# Patient Record
Sex: MALE | Race: OTHER | Employment: UNEMPLOYED | URBAN - METROPOLITAN AREA
[De-identification: names, ages, dates, MRNs, and addresses within clinical notes are randomized per-mention and may not be internally consistent; named-entity substitution may affect disease eponyms.]

---

## 2018-12-16 ENCOUNTER — HOSPITAL ENCOUNTER (INPATIENT)
Age: 43
LOS: 5 days | Discharge: HOME | DRG: 603 | End: 2018-12-21
Attending: EMERGENCY MEDICINE | Admitting: INTERNAL MEDICINE
Payer: SELF-PAY

## 2018-12-16 DIAGNOSIS — L03.114 CELLULITIS OF LEFT UPPER EXTREMITY: Primary | ICD-10-CM

## 2018-12-16 PROBLEM — F17.200 SMOKING: Status: ACTIVE | Noted: 2018-12-16

## 2018-12-16 PROBLEM — L03.90 CELLULITIS: Status: ACTIVE | Noted: 2018-12-16

## 2018-12-16 LAB
ALBUMIN SERPL-MCNC: 3.4 G/DL (ref 3.5–5)
ALBUMIN/GLOB SERPL: 0.9 {RATIO} (ref 1.2–3.5)
ALP SERPL-CCNC: 139 U/L (ref 50–136)
ALT SERPL-CCNC: 27 U/L (ref 12–65)
ANION GAP SERPL CALC-SCNC: 7 MMOL/L (ref 7–16)
AST SERPL-CCNC: 13 U/L (ref 15–37)
BASOPHILS # BLD: 0 K/UL (ref 0–0.2)
BASOPHILS NFR BLD: 0 % (ref 0–2)
BILIRUB SERPL-MCNC: 0.4 MG/DL (ref 0.2–1.1)
BUN SERPL-MCNC: 9 MG/DL (ref 6–23)
CALCIUM SERPL-MCNC: 8.3 MG/DL (ref 8.3–10.4)
CHLORIDE SERPL-SCNC: 106 MMOL/L (ref 98–107)
CO2 SERPL-SCNC: 28 MMOL/L (ref 21–32)
CREAT SERPL-MCNC: 0.76 MG/DL (ref 0.8–1.5)
DIFFERENTIAL METHOD BLD: ABNORMAL
EOSINOPHIL # BLD: 0.5 K/UL (ref 0–0.8)
EOSINOPHIL NFR BLD: 4 % (ref 0.5–7.8)
ERYTHROCYTE [DISTWIDTH] IN BLOOD BY AUTOMATED COUNT: 14 % (ref 11.9–14.6)
GLOBULIN SER CALC-MCNC: 3.9 G/DL (ref 2.3–3.5)
GLUCOSE SERPL-MCNC: 116 MG/DL (ref 65–100)
HCT VFR BLD AUTO: 41.5 % (ref 41.1–50.3)
HGB BLD-MCNC: 13.8 G/DL (ref 13.6–17.2)
IMM GRANULOCYTES # BLD: 0.1 K/UL (ref 0–0.5)
IMM GRANULOCYTES NFR BLD AUTO: 1 % (ref 0–5)
LACTATE BLD-SCNC: 1.01 MMOL/L (ref 0.5–1.9)
LACTATE BLD-SCNC: 1.88 MMOL/L (ref 0.5–1.9)
LYMPHOCYTES # BLD: 1.4 K/UL (ref 0.5–4.6)
LYMPHOCYTES NFR BLD: 10 % (ref 13–44)
MCH RBC QN AUTO: 29.1 PG (ref 26.1–32.9)
MCHC RBC AUTO-ENTMCNC: 33.3 G/DL (ref 31.4–35)
MCV RBC AUTO: 87.6 FL (ref 79.6–97.8)
MONOCYTES # BLD: 1.3 K/UL (ref 0.1–1.3)
MONOCYTES NFR BLD: 9 % (ref 4–12)
NEUTS SEG # BLD: 11.1 K/UL (ref 1.7–8.2)
NEUTS SEG NFR BLD: 77 % (ref 43–78)
NRBC # BLD: 0 K/UL (ref 0–0.2)
PLATELET # BLD AUTO: 266 K/UL (ref 150–450)
PMV BLD AUTO: 9 FL (ref 9.4–12.3)
POTASSIUM SERPL-SCNC: 3.4 MMOL/L (ref 3.5–5.1)
PROT SERPL-MCNC: 7.3 G/DL (ref 6.3–8.2)
RBC # BLD AUTO: 4.74 M/UL (ref 4.23–5.6)
SODIUM SERPL-SCNC: 141 MMOL/L (ref 136–145)
WBC # BLD AUTO: 14.4 K/UL (ref 4.3–11.1)

## 2018-12-16 PROCEDURE — 77030027138 HC INCENT SPIROMETER -A

## 2018-12-16 PROCEDURE — 74011250636 HC RX REV CODE- 250/636: Performed by: EMERGENCY MEDICINE

## 2018-12-16 PROCEDURE — 85025 COMPLETE CBC W/AUTO DIFF WBC: CPT

## 2018-12-16 PROCEDURE — 74011000258 HC RX REV CODE- 258: Performed by: INTERNAL MEDICINE

## 2018-12-16 PROCEDURE — 80053 COMPREHEN METABOLIC PANEL: CPT

## 2018-12-16 PROCEDURE — 99284 EMERGENCY DEPT VISIT MOD MDM: CPT | Performed by: EMERGENCY MEDICINE

## 2018-12-16 PROCEDURE — 74011250637 HC RX REV CODE- 250/637: Performed by: INTERNAL MEDICINE

## 2018-12-16 PROCEDURE — 65270000029 HC RM PRIVATE

## 2018-12-16 PROCEDURE — 74011250636 HC RX REV CODE- 250/636: Performed by: INTERNAL MEDICINE

## 2018-12-16 PROCEDURE — 83605 ASSAY OF LACTIC ACID: CPT

## 2018-12-16 RX ORDER — NICOTINE 7MG/24HR
1 PATCH, TRANSDERMAL 24 HOURS TRANSDERMAL EVERY 24 HOURS
Status: DISCONTINUED | OUTPATIENT
Start: 2018-12-16 | End: 2018-12-21 | Stop reason: HOSPADM

## 2018-12-16 RX ORDER — DOCUSATE SODIUM 100 MG/1
100 CAPSULE, LIQUID FILLED ORAL
Status: DISCONTINUED | OUTPATIENT
Start: 2018-12-16 | End: 2018-12-21 | Stop reason: HOSPADM

## 2018-12-16 RX ORDER — SODIUM CHLORIDE 0.9 % (FLUSH) 0.9 %
5-10 SYRINGE (ML) INJECTION EVERY 8 HOURS
Status: DISCONTINUED | OUTPATIENT
Start: 2018-12-16 | End: 2018-12-21 | Stop reason: HOSPADM

## 2018-12-16 RX ORDER — VANCOMYCIN HYDROCHLORIDE
1250 EVERY 8 HOURS
Status: DISCONTINUED | OUTPATIENT
Start: 2018-12-17 | End: 2018-12-17

## 2018-12-16 RX ORDER — MORPHINE SULFATE 2 MG/ML
4 INJECTION, SOLUTION INTRAMUSCULAR; INTRAVENOUS
Status: COMPLETED | OUTPATIENT
Start: 2018-12-16 | End: 2018-12-16

## 2018-12-16 RX ORDER — ACETAMINOPHEN 325 MG/1
650 TABLET ORAL
Status: DISCONTINUED | OUTPATIENT
Start: 2018-12-16 | End: 2018-12-21 | Stop reason: HOSPADM

## 2018-12-16 RX ORDER — ZOLPIDEM TARTRATE 5 MG/1
5 TABLET ORAL
Status: DISCONTINUED | OUTPATIENT
Start: 2018-12-16 | End: 2018-12-21 | Stop reason: HOSPADM

## 2018-12-16 RX ORDER — ONDANSETRON 2 MG/ML
4 INJECTION INTRAMUSCULAR; INTRAVENOUS
Status: DISCONTINUED | OUTPATIENT
Start: 2018-12-16 | End: 2018-12-21 | Stop reason: HOSPADM

## 2018-12-16 RX ORDER — VANCOMYCIN 1.75 GRAM/500 ML IN 0.9 % SODIUM CHLORIDE INTRAVENOUS
1750
Status: COMPLETED | OUTPATIENT
Start: 2018-12-16 | End: 2018-12-16

## 2018-12-16 RX ORDER — SODIUM CHLORIDE 0.9 % (FLUSH) 0.9 %
5-10 SYRINGE (ML) INJECTION AS NEEDED
Status: DISCONTINUED | OUTPATIENT
Start: 2018-12-16 | End: 2018-12-21 | Stop reason: HOSPADM

## 2018-12-16 RX ORDER — ONDANSETRON 2 MG/ML
4 INJECTION INTRAMUSCULAR; INTRAVENOUS
Status: COMPLETED | OUTPATIENT
Start: 2018-12-16 | End: 2018-12-16

## 2018-12-16 RX ORDER — DIPHENHYDRAMINE HCL 25 MG
25 CAPSULE ORAL
Status: DISCONTINUED | OUTPATIENT
Start: 2018-12-16 | End: 2018-12-21 | Stop reason: HOSPADM

## 2018-12-16 RX ADMIN — MORPHINE SULFATE 4 MG: 2 INJECTION, SOLUTION INTRAMUSCULAR; INTRAVENOUS at 19:15

## 2018-12-16 RX ADMIN — ONDANSETRON 4 MG: 2 INJECTION INTRAMUSCULAR; INTRAVENOUS at 19:16

## 2018-12-16 RX ADMIN — Medication 10 ML: at 21:09

## 2018-12-16 RX ADMIN — CEFTRIAXONE SODIUM 1 G: 1 INJECTION, POWDER, FOR SOLUTION INTRAMUSCULAR; INTRAVENOUS at 19:13

## 2018-12-16 RX ADMIN — ACETAMINOPHEN 650 MG: 325 TABLET ORAL at 22:44

## 2018-12-16 RX ADMIN — Medication 5 ML: at 22:50

## 2018-12-16 RX ADMIN — VANCOMYCIN HYDROCHLORIDE 1750 MG: 10 INJECTION, POWDER, LYOPHILIZED, FOR SOLUTION INTRAVENOUS at 20:28

## 2018-12-16 RX ADMIN — SODIUM CHLORIDE 1000 ML: 900 INJECTION, SOLUTION INTRAVENOUS at 19:09

## 2018-12-16 NOTE — H&P
History and Physical    Patient: Yanique Mane MRN: [id number]  SSN: [id number]    YOB: 1975  Age: 37 y.o. Sex: male      Subjective:      Yanique Mane is a 37 y.o. male who came to ER due to left upper arm pain and redness and swelling for 1 day. Patient denies medical problems except that he has been smoking 5 cigarettes per day for the past few years. Yesterday he was working on fixing his car. Afterwards, he noticed that he had swelling and redness and pain at the left upper arm. Some fever. No chills. Appetite and oral intake is less than usual.     Patient noticed that the swelling and redness got worse today. He decided to come to ER. It is deemed to be extensive cellulitis. Also girlfriend recently was diagnosed with MRSA infection. There is suspicion that this patient's cellulitis could be from MRSA. PMH   Denies    Social history   Smoking 5 cigarettes per day. Works as a   Speaks [facility]. Girlfriend helps as an . Family history   No hereditary conditions. No past medical history on file. No past surgical history on file. No family history on file. Social History     Tobacco Use    Smoking status: Not on file   Substance Use Topics    Alcohol use: Not on file      Prior to Admission medications    Not on File        No Known Allergies    Review of Systems:    Constitutional: Negative for chills and + fever. HENT: Negative for rhinorrhea and sore throat. Eyes: Negative for pain, redness and visual disturbance. Respiratory: Negative for chest tightness, shortness of breath and wheezing. Cardiovascular: Negative for chest pain and leg swelling. Gastrointestinal: Negative for abdominal pain, bowel incontinence, diarrhea, nausea and vomiting. Genitourinary: Negative for bladder incontinence, dysuria and hematuria.    Musculoskeletal: Negative for back pain, gait problem, neck pain and neck stiffness. Skin: left arm with problems as above. Neurological: negative for speech difficulty. Negative for focal weakness, weakness, numbness, headaches and loss of balance. Psychiatric/Behavioral: Negative for agitation, confusion and memory loss. Objective:     Vitals:    12/16/18 1454   BP: 110/81   Pulse: (!) 117   Resp: 16   Temp: 99 °F (37.2 °C)   SpO2: 99%   Weight: 70.3 kg (155 lb)   Height: 5' 6\" (1.676 m)        Physical Exam:    General:                    The patient is a pleasant male in no acute distress. speaking limited English. Head:                                   Normocephalic/atraumatic. Eyes:                                   No palpebral pallor or scleral icterus. ENT:                                    External auricular and nasal exam within normal limits. Mucous membranes are moist.  Neck:                                   Supple, non-tender, no JVD. Lungs:                       Clear to auscultation bilaterally without wheezes or crackles. No respiratory distress or accessory muscle use. Heart:                                  Regular rate and rhythm, without murmurs, rubs, or gallops. Abdomen:                  Soft, non-tender, non-distended with normoactive bowel sounds. Genitourinary:           No tenderness over the bladder or bilateral CVAs. Extremities:               Without clubbing, cyanosis, or edema. Skin:                                   redness and swelling and warm of the upper left arm. Tender. No fluctuation. Pulses:                      Radial and dorsalis pedis pulses present 2+ bilaterally. Capillary refill <2s. Neurologic:                CN II-XII grossly intact and symmetrical.                                               Moving all four extremities well with no focal deficits.   Psychiatric: Pleasant demeanor, appropriate affect. Alert and oriented x 3    Lab and data    Recent Results (from the past 24 hour(s))   CBC WITH AUTOMATED DIFF    Collection Time: 12/16/18  2:59 PM   Result Value Ref Range    WBC 14.4 (H) 4.3 - 11.1 K/uL    RBC 4.74 4.23 - 5.6 M/uL    HGB 13.8 13.6 - 17.2 g/dL    HCT 41.5 41.1 - 50.3 %    MCV 87.6 79.6 - 97.8 FL    MCH 29.1 26.1 - 32.9 PG    MCHC 33.3 31.4 - 35.0 g/dL    RDW 14.0 11.9 - 14.6 %    PLATELET 372 935 - 920 K/uL    MPV 9.0 (L) 9.4 - 12.3 FL    ABSOLUTE NRBC 0.00 0.0 - 0.2 K/uL    DF AUTOMATED      NEUTROPHILS 77 43 - 78 %    LYMPHOCYTES 10 (L) 13 - 44 %    MONOCYTES 9 4.0 - 12.0 %    EOSINOPHILS 4 0.5 - 7.8 %    BASOPHILS 0 0.0 - 2.0 %    IMMATURE GRANULOCYTES 1 0.0 - 5.0 %    ABS. NEUTROPHILS 11.1 (H) 1.7 - 8.2 K/UL    ABS. LYMPHOCYTES 1.4 0.5 - 4.6 K/UL    ABS. MONOCYTES 1.3 0.1 - 1.3 K/UL    ABS. EOSINOPHILS 0.5 0.0 - 0.8 K/UL    ABS. BASOPHILS 0.0 0.0 - 0.2 K/UL    ABS. IMM. GRANS. 0.1 0.0 - 0.5 K/UL   METABOLIC PANEL, COMPREHENSIVE    Collection Time: 12/16/18  2:59 PM   Result Value Ref Range    Sodium 141 136 - 145 mmol/L    Potassium 3.4 (L) 3.5 - 5.1 mmol/L    Chloride 106 98 - 107 mmol/L    CO2 28 21 - 32 mmol/L    Anion gap 7 7 - 16 mmol/L    Glucose 116 (H) 65 - 100 mg/dL    BUN 9 6 - 23 MG/DL    Creatinine 0.76 (L) 0.8 - 1.5 MG/DL    GFR est AA >60 >60 ml/min/1.73m2    GFR est non-AA >60 >60 ml/min/1.73m2    Calcium 8.3 8.3 - 10.4 MG/DL    Bilirubin, total 0.4 0.2 - 1.1 MG/DL    ALT (SGPT) 27 12 - 65 U/L    AST (SGOT) 13 (L) 15 - 37 U/L    Alk. phosphatase 139 (H) 50 - 136 U/L    Protein, total 7.3 6.3 - 8.2 g/dL    Albumin 3.4 (L) 3.5 - 5.0 g/dL    Globulin 3.9 (H) 2.3 - 3.5 g/dL    A-G Ratio 0.9 (L) 1.2 - 3.5     POC LACTIC ACID    Collection Time: 12/16/18  3:00 PM   Result Value Ref Range    Lactic Acid (POC) 1.88 0.5 - 1.9 mmol/L     Reportedly patient had ultrasound of the left arm and it does not show necrosis and fluid collection. Assessment:     Hospital Problems  Never Reviewed          Codes Class Noted POA    * (Principal) Cellulitis ICD-10-CM: L03.90  ICD-9-CM: 682.9  12/16/2018 Unknown        Smoking ICD-10-CM: F17.200  ICD-9-CM: 305.1  12/16/2018 Unknown              Plan:     Cellulitis of the left upper arm   Admit to medical floor. IV fluid. Suspicious for MRSA possibility. Start Vancomycin and Rocephin  Symptomatic treatments    Smoking  I advised patient to quit. Nicotine patch. Patient requires hospital stay as an in-patient and anticipated stay is more than 2 midnights due to the serious nature of the illness. I have discussed with patient regarding advance directive. Patient would like to have a full-code status. I have discussed the plan of care with patient and girlfriend at bedside. He has some pain that will be covered and treated with Acetaminophen.      DVT prophylaxis : SCD        Signed By: Dina Cabrera MD     December 16, 2018

## 2018-12-16 NOTE — ED TRIAGE NOTES
Patient reports left upper arm redness, swelling and warmth. Denies injury or insect bite. No open wounds or drainage from area.

## 2018-12-16 NOTE — ED PROVIDER NOTES
HPI:  37 M, here with left arm pain and swollen. Just today has some mild discomfort in the left arm. Denies any trauma. No bites. No obvious reason why this is hardened. Woke up this morning with significant swelling and redness and severe tenderness of the left bicep. Denies any fever at home. He works as a . Hands has multiple cuts and wound throughout the nailbed  And his hands. Denies any IV drug use. Smokes cigarette. No chronic medical problems    ROS  Constitutional: No fever, no chills  Skin: no rash  Eye: No vision changes  ENMT: No sore throat  Respiratory: No shortness of breath, no cough  Cardiovascular: No chest pain, no palpitations  Gastrointestinal: No vomiting, no nausea, no diarrhea, no abdominal pain  : No dysuria  MSK: No back pain, no joint pain  Neuro: No headache, no change in mental status, no numbness, no tingling, no weakness    All other review of systems positive per history of present illness and the above otherwise negative or noncontributory. Visit Vitals  /81 (BP 1 Location: Right arm, BP Patient Position: At rest)   Pulse (!) 117   Temp 99 °F (37.2 °C)   Resp 16   Ht 5' 6\" (1.676 m)   Wt 70.3 kg (155 lb)   SpO2 99%   BMI 25.02 kg/m²     No past medical history on file. No past surgical history on file. None         Adult Exam   General: alert, no acute distress  Head: normocephalic, atraumatic  ENT: moist mucous membranes  Neck: supple, non-tender; full range of motion  Cardiovascular: regular rate and rhythm, normal peripheral perfusion, no edema  Respiratory:  normal respirations; no wheezing, rales or rhonchi  Gastrointestinal: soft, non-tender; no rebound or guarding, no peritoneal signs, no distension  Back: non-tender, full range of motion  Musculoskeletal: left bicep with erythema, edema, tenderness to palpation without any skin crepitus. He is able to flex and extend the left arm.   His left hand and right hand has multiple small microperforation with oil residuethroughout there are no signs of cellulitis. Positive left axillary lymphadenopathy. Neurological: alert and oriented x 4, no gross focal deficits; normal speech  Psychiatric: cooperative; appropriate mood and affect    MDM:  Bedside ultrasound appear consistent with cellulitis. There are no signs of involvement of the muscle layer. No abscess noted. Do not suspectnecrotizing fasciitis at this time. Lactic acid mildly elevated at 1.8 which we will repeat. He is tachycardic. We'll obtain blood culture, repeat lactic acid would leukocytosis of 14.4. We'll treat with vancomycin for left arm cellulitis. Of note his girlfriend was recently admitted to the hospital for cellulitis with suspected MRSA  Spelled the hospitalist patient will be admitted at this time for further management       No results found. Recent Results (from the past 24 hour(s))   CBC WITH AUTOMATED DIFF    Collection Time: 12/16/18  2:59 PM   Result Value Ref Range    WBC 14.4 (H) 4.3 - 11.1 K/uL    RBC 4.74 4.23 - 5.6 M/uL    HGB 13.8 13.6 - 17.2 g/dL    HCT 41.5 41.1 - 50.3 %    MCV 87.6 79.6 - 97.8 FL    MCH 29.1 26.1 - 32.9 PG    MCHC 33.3 31.4 - 35.0 g/dL    RDW 14.0 11.9 - 14.6 %    PLATELET 037 345 - 699 K/uL    MPV 9.0 (L) 9.4 - 12.3 FL    ABSOLUTE NRBC 0.00 0.0 - 0.2 K/uL    DF AUTOMATED      NEUTROPHILS 77 43 - 78 %    LYMPHOCYTES 10 (L) 13 - 44 %    MONOCYTES 9 4.0 - 12.0 %    EOSINOPHILS 4 0.5 - 7.8 %    BASOPHILS 0 0.0 - 2.0 %    IMMATURE GRANULOCYTES 1 0.0 - 5.0 %    ABS. NEUTROPHILS 11.1 (H) 1.7 - 8.2 K/UL    ABS. LYMPHOCYTES 1.4 0.5 - 4.6 K/UL    ABS. MONOCYTES 1.3 0.1 - 1.3 K/UL    ABS. EOSINOPHILS 0.5 0.0 - 0.8 K/UL    ABS. BASOPHILS 0.0 0.0 - 0.2 K/UL    ABS. IMM.  GRANS. 0.1 0.0 - 0.5 K/UL   METABOLIC PANEL, COMPREHENSIVE    Collection Time: 12/16/18  2:59 PM   Result Value Ref Range    Sodium 141 136 - 145 mmol/L    Potassium 3.4 (L) 3.5 - 5.1 mmol/L    Chloride 106 98 - 107 mmol/L    CO2 28 21 - 32 mmol/L    Anion gap 7 7 - 16 mmol/L    Glucose 116 (H) 65 - 100 mg/dL    BUN 9 6 - 23 MG/DL    Creatinine 0.76 (L) 0.8 - 1.5 MG/DL    GFR est AA >60 >60 ml/min/1.73m2    GFR est non-AA >60 >60 ml/min/1.73m2    Calcium 8.3 8.3 - 10.4 MG/DL    Bilirubin, total 0.4 0.2 - 1.1 MG/DL    ALT (SGPT) 27 12 - 65 U/L    AST (SGOT) 13 (L) 15 - 37 U/L    Alk. phosphatase 139 (H) 50 - 136 U/L    Protein, total 7.3 6.3 - 8.2 g/dL    Albumin 3.4 (L) 3.5 - 5.0 g/dL    Globulin 3.9 (H) 2.3 - 3.5 g/dL    A-G Ratio 0.9 (L) 1.2 - 3.5     POC LACTIC ACID    Collection Time: 12/16/18  3:00 PM   Result Value Ref Range    Lactic Acid (POC) 1.88 0.5 - 1.9 mmol/L         Dragon voice recognition software was used to create this note. Although the note has been reviewed and corrected where necessary, additional errors may have been overlooked and remain in the text.

## 2018-12-17 PROCEDURE — 65270000029 HC RM PRIVATE

## 2018-12-17 PROCEDURE — 74011000258 HC RX REV CODE- 258: Performed by: INTERNAL MEDICINE

## 2018-12-17 PROCEDURE — 74011250637 HC RX REV CODE- 250/637: Performed by: INTERNAL MEDICINE

## 2018-12-17 PROCEDURE — 74011250636 HC RX REV CODE- 250/636: Performed by: INTERNAL MEDICINE

## 2018-12-17 PROCEDURE — 77030032490 HC SLV COMPR SCD KNE COVD -B

## 2018-12-17 RX ORDER — VANCOMYCIN/0.9 % SOD CHLORIDE 1.5G/250ML
1500 PLASTIC BAG, INJECTION (ML) INTRAVENOUS EVERY 12 HOURS
Status: DISCONTINUED | OUTPATIENT
Start: 2018-12-17 | End: 2018-12-18

## 2018-12-17 RX ADMIN — Medication 5 ML: at 04:05

## 2018-12-17 RX ADMIN — CEFTRIAXONE SODIUM 1 G: 1 INJECTION, POWDER, FOR SOLUTION INTRAMUSCULAR; INTRAVENOUS at 17:48

## 2018-12-17 RX ADMIN — Medication 5 ML: at 13:29

## 2018-12-17 RX ADMIN — VANCOMYCIN HYDROCHLORIDE 1500 MG: 10 INJECTION, POWDER, LYOPHILIZED, FOR SOLUTION INTRAVENOUS at 15:01

## 2018-12-17 RX ADMIN — Medication 5 ML: at 22:17

## 2018-12-17 RX ADMIN — VANCOMYCIN HYDROCHLORIDE 1250 MG: 10 INJECTION, POWDER, LYOPHILIZED, FOR SOLUTION INTRAVENOUS at 04:03

## 2018-12-17 NOTE — PROGRESS NOTES
Pharmacokinetic Consult to Pharmacist    Delmi Brayan is a 37 y.o. male being treated for skin and soft tissue infection with vancomycin. Height: 5' 6\" (167.6 cm)  Weight: 70.3 kg (155 lb)  Lab Results   Component Value Date/Time    BUN 9 12/16/2018 02:59 PM    Creatinine 0.76 (L) 12/16/2018 02:59 PM    WBC 14.4 (H) 12/16/2018 02:59 PM    Lactic Acid (POC) 1.01 12/16/2018 08:11 PM      Estimated Creatinine Clearance: 113.1 mL/min (A) (based on SCr of 0.76 mg/dL (L)). CULTURES:  All Micro Results     None            Day 1 of vancomycin. Goal trough is 10-20. Vancomycin dose initiated at 1,750 mg load, followed by 1,250 mg q8h. Will continue to follow patient.       Thank you,  Florencio Montes De Oca, PharmD

## 2018-12-17 NOTE — PROGRESS NOTES
Pharmacokinetic Consult to Pharmacist    Meenakshi Covarrubias is a 37 y.o. male being treated for left arm cellulitis with vancomycin and ceftriaxone. Height: 5' 6\" (167.6 cm)  Weight: 70.3 kg (155 lb)  Lab Results   Component Value Date/Time    BUN 9 12/16/2018 02:59 PM    Creatinine 0.76 (L) 12/16/2018 02:59 PM    WBC 14.4 (H) 12/16/2018 02:59 PM    Lactic Acid (POC) 1.01 12/16/2018 08:11 PM      Estimated Creatinine Clearance: 113.1 mL/min (A) (based on SCr of 0.76 mg/dL (L)). CULTURES:  All Micro Results     None            Day 2 of vancomycin. Goal trough is 10-20. Dose was empirically adjusted to 1500 mg IV q12h given indication and patient specific kinetics. Levels will be ordered as clinically indicated. Pharmacy will continue to follow. Please call with any questions.     Thank you,  Genaro Uribe, PharmD  Clinical Pharmacist  [phone]

## 2018-12-17 NOTE — PROGRESS NOTES
END OF SHIFT NOTE:    INTAKE/OUTPUT  12/16 0701 - 12/17 0700  In: 210 [I.V.:210]  Out: 200 [Urine:200]  Voiding: YES  Catheter: NO  Drain:              Flatus: Patient does have flatus present. Stool:  0 occurrences. Characteristics:       Emesis: 0 occurrences. Characteristics:        VITAL SIGNS  Patient Vitals for the past 12 hrs:   Temp Pulse Resp BP SpO2   12/17/18 0404 98.8 °F (37.1 °C) (!) 104 16 119/74 99 %   12/16/18 2315 98.7 °F (37.1 °C) (!) 105 16 107/69 96 %   12/16/18 2040 99.1 °F (37.3 °C) 94 16 129/81 99 %   12/16/18 2015 98.6 °F (37 °C) (!) 108 16 117/76 99 %       Pain Assessment  Pain Intensity 1: 8 (12/16/18 1454)        Patient Stated Pain Goal: 0    Ambulating  Yes  Took   Tylenol for arm pain with relief. Slept intermittently. Shift report given to oncoming nurse at the bedside.     Nola Funk RN

## 2018-12-17 NOTE — PROGRESS NOTES
Progress Note    Patient: Neelam Nieslen MRN: [id number]  SSN: [id number]    YOB: 1975  Age: 37 y.o. Sex: male      Admit Date: 12/16/2018    LOS: 1 day     Subjective:     Admitted due to left arm swelling. Diagnosed with cellulitis. No medical problems besides smoking. Today, cellulitis is better. Less redness. Less swelling. No fever. No shaking. No chills. Patient promised to stop smoking. Objective:     Vitals:    12/16/18 2040 12/16/18 2315 12/17/18 0404 12/17/18 0708   BP: 129/81 107/69 119/74 122/74   Pulse: 94 (!) 105 (!) 104 90   Resp: 16 16 16 17   Temp: 99.1 °F (37.3 °C) 98.7 °F (37.1 °C) 98.8 °F (37.1 °C) 99.1 °F (37.3 °C)   SpO2: 99% 96% 99% 98%   Weight:       Height:            Intake and Output:  Current Shift: No intake/output data recorded. Last three shifts: 12/15 1901 - 12/17 0700  In: 971 [P.O.:480; I.V.:491]  Out: 200 [Urine:200]    Physical Exam:     Physical Exam:     General:                    The patient is a pleasant male in no acute distress.    Head:                                   Normocephalic/atraumatic. Eyes:                                   No palpebral pallor or scleral icterus. ENT:                                    External auricular and nasal exam within normal limits.                                             WGJBMI membranes are moist.  Neck:                                   Supple, non-tender, no JVD. Lungs:                       Clear to auscultation bilaterally without wheezes or crackles.                                             No respiratory distress or accessory muscle use. Heart:                                  Regular rate and rhythm, without murmurs, rubs, or gallops. Abdomen:                  Soft, non-tender, non-distended with normoactive bowel sounds. Genitourinary:           No tenderness over the bladder or bilateral CVAs.   Extremities:               Without clubbing, cyanosis, or edema.  Skin:                                   redness and swelling and warmth of the upper left arm are less. Less tender. No fluctuation. Pulses:                      Radial and dorsalis pedis pulses present 2+ bilaterally.                                               Capillary refill <2s. Neurologic:                CN II-XII grossly intact and symmetrical.                                               Moving all four extremities well with no focal deficits. Psychiatric:                Pleasant demeanor, appropriate affect. Alert and oriented x 3        Lab/Data Review:    Recent Results (from the past 24 hour(s))   CBC WITH AUTOMATED DIFF    Collection Time: 12/16/18  2:59 PM   Result Value Ref Range    WBC 14.4 (H) 4.3 - 11.1 K/uL    RBC 4.74 4.23 - 5.6 M/uL    HGB 13.8 13.6 - 17.2 g/dL    HCT 41.5 41.1 - 50.3 %    MCV 87.6 79.6 - 97.8 FL    MCH 29.1 26.1 - 32.9 PG    MCHC 33.3 31.4 - 35.0 g/dL    RDW 14.0 11.9 - 14.6 %    PLATELET 722 993 - 426 K/uL    MPV 9.0 (L) 9.4 - 12.3 FL    ABSOLUTE NRBC 0.00 0.0 - 0.2 K/uL    DF AUTOMATED      NEUTROPHILS 77 43 - 78 %    LYMPHOCYTES 10 (L) 13 - 44 %    MONOCYTES 9 4.0 - 12.0 %    EOSINOPHILS 4 0.5 - 7.8 %    BASOPHILS 0 0.0 - 2.0 %    IMMATURE GRANULOCYTES 1 0.0 - 5.0 %    ABS. NEUTROPHILS 11.1 (H) 1.7 - 8.2 K/UL    ABS. LYMPHOCYTES 1.4 0.5 - 4.6 K/UL    ABS. MONOCYTES 1.3 0.1 - 1.3 K/UL    ABS. EOSINOPHILS 0.5 0.0 - 0.8 K/UL    ABS. BASOPHILS 0.0 0.0 - 0.2 K/UL    ABS. IMM.  GRANS. 0.1 0.0 - 0.5 K/UL   METABOLIC PANEL, COMPREHENSIVE    Collection Time: 12/16/18  2:59 PM   Result Value Ref Range    Sodium 141 136 - 145 mmol/L    Potassium 3.4 (L) 3.5 - 5.1 mmol/L    Chloride 106 98 - 107 mmol/L    CO2 28 21 - 32 mmol/L    Anion gap 7 7 - 16 mmol/L    Glucose 116 (H) 65 - 100 mg/dL    BUN 9 6 - 23 MG/DL    Creatinine 0.76 (L) 0.8 - 1.5 MG/DL    GFR est AA >60 >60 ml/min/1.73m2    GFR est non-AA >60 >60 ml/min/1.73m2    Calcium 8.3 8.3 - 10.4 MG/DL    Bilirubin, total 0.4 0.2 - 1.1 MG/DL    ALT (SGPT) 27 12 - 65 U/L    AST (SGOT) 13 (L) 15 - 37 U/L    Alk. phosphatase 139 (H) 50 - 136 U/L    Protein, total 7.3 6.3 - 8.2 g/dL    Albumin 3.4 (L) 3.5 - 5.0 g/dL    Globulin 3.9 (H) 2.3 - 3.5 g/dL    A-G Ratio 0.9 (L) 1.2 - 3.5     POC LACTIC ACID    Collection Time: 12/16/18  3:00 PM   Result Value Ref Range    Lactic Acid (POC) 1.88 0.5 - 1.9 mmol/L   POC LACTIC ACID    Collection Time: 12/16/18  8:11 PM   Result Value Ref Range    Lactic Acid (POC) 1.01 0.5 - 1.9 mmol/L     All Micro Results     None          Current Facility-Administered Medications:     sodium chloride (NS) flush 5-10 mL, 5-10 mL, IntraVENous, Q8H, Giovanni Lam MD, 5 mL at 12/17/18 0405    sodium chloride (NS) flush 5-10 mL, 5-10 mL, IntraVENous, PRN, Giovanni Lam MD, 5 mL at 12/16/18 2250    acetaminophen (TYLENOL) tablet 650 mg, 650 mg, Oral, Q6H PRN, Giovanni Lam MD, 650 mg at 12/16/18 2244    diphenhydrAMINE (BENADRYL) capsule 25 mg, 25 mg, Oral, Q6H PRN, Giovanni Lam MD    ondansetron (ZOFRAN) injection 4 mg, 4 mg, IntraVENous, Q6H PRN, Giovanni Lam MD    zolpidem (AMBIEN) tablet 5 mg, 5 mg, Oral, QHS PRN, Giovanni Lam MD    docusate sodium (COLACE) capsule 100 mg, 100 mg, Oral, DAILY PRN, Giovanni Lam MD    nicotine (NICODERM CQ) 7 mg/24 hr patch 1 Patch, 1 Patch, TransDERmal, Q24H, Giovanni Lam MD, 1 Patch at 12/16/18 1915    cefTRIAXone (ROCEPHIN) 1 g in 0.9% sodium chloride (MBP/ADV) 50 mL, 1 g, IntraVENous, Q24H, Giovanni Lam MD, Stopped at 12/16/18 1943    vancomycin (VANCOCIN) 1250 mg in  ml infusion, 1,250 mg, IntraVENous, Q8H, Giovanni Lam MD, Last Rate: 125 mL/hr at 12/17/18 0403, 1,250 mg at 12/17/18 0403      Assessment:     Principal Problem:    Cellulitis (12/16/2018)    Active Problems:    Smoking (12/16/2018)        Plan:     Cellulitis  Improving  Continue current vancomycin and Ceftriaxone. Girlfriend had recent MRSA infection. Smoking  I advised patient to quit. Continue Nicotine patch. Advised good oral hydration with water. I have discussed the plan of care with patient. DVT prophylaxis : SCD    Disposition plan : likely home in 1-2 days.      Signed By: Alton Peña MD     December 17, 2018

## 2018-12-17 NOTE — PROGRESS NOTES
TRANSFER - IN REPORT:    Verbal report received from Diamante 1475 to Emily to myself(name) on [facility]  being received from ER(unit) for routine progression of care      Report consisted of patients Situation, Background, Assessment and   Recommendations(SBAR). Information from the following report(s) ED Summary was reviewed with the receiving nurse. Opportunity for questions and clarification was provided. Assessment completed upon patients arrival to unit and care assumed. Awake, alert, oriented x4. Left upper arm pink/warm. States no recent tattoos or piercings/bites to account for infection. Wife translating. Given tylenol for pain. Scattered abrasions.

## 2018-12-17 NOTE — ED NOTES
TRANSFER - OUT REPORT:    Verbal report given to Cameron(name) on Nanda Morales  being transferred to 220(unit) for routine progression of care       Report consisted of patients Situation, Background, Assessment and   Recommendations(SBAR). Information from the following report(s) ED Summary was reviewed with the receiving nurse. Lines:   Peripheral IV 12/16/18 Right Forearm (Active)        Opportunity for questions and clarification was provided.       Patient transported with:   [employer]

## 2018-12-18 LAB
ERYTHROCYTE [DISTWIDTH] IN BLOOD BY AUTOMATED COUNT: 13.8 % (ref 11.9–14.6)
HCT VFR BLD AUTO: 39.9 % (ref 41.1–50.3)
HGB BLD-MCNC: 13.3 G/DL (ref 13.6–17.2)
MCH RBC QN AUTO: 29.3 PG (ref 26.1–32.9)
MCHC RBC AUTO-ENTMCNC: 33.3 G/DL (ref 31.4–35)
MCV RBC AUTO: 87.9 FL (ref 79.6–97.8)
NRBC # BLD: 0 K/UL (ref 0–0.2)
PLATELET # BLD AUTO: 267 K/UL (ref 150–450)
PMV BLD AUTO: 9 FL (ref 9.4–12.3)
RBC # BLD AUTO: 4.54 M/UL (ref 4.23–5.6)
VANCOMYCIN TROUGH SERPL-MCNC: 4.6 UG/ML (ref 5–20)
WBC # BLD AUTO: 12.3 K/UL (ref 4.3–11.1)

## 2018-12-18 PROCEDURE — 74011250636 HC RX REV CODE- 250/636: Performed by: HOSPITALIST

## 2018-12-18 PROCEDURE — 74011250636 HC RX REV CODE- 250/636: Performed by: INTERNAL MEDICINE

## 2018-12-18 PROCEDURE — 36415 COLL VENOUS BLD VENIPUNCTURE: CPT

## 2018-12-18 PROCEDURE — 74011250637 HC RX REV CODE- 250/637: Performed by: INTERNAL MEDICINE

## 2018-12-18 PROCEDURE — 65270000029 HC RM PRIVATE

## 2018-12-18 PROCEDURE — 85027 COMPLETE CBC AUTOMATED: CPT

## 2018-12-18 PROCEDURE — 80202 ASSAY OF VANCOMYCIN: CPT

## 2018-12-18 PROCEDURE — 74011000258 HC RX REV CODE- 258: Performed by: INTERNAL MEDICINE

## 2018-12-18 RX ORDER — VANCOMYCIN/0.9 % SOD CHLORIDE 1.5G/250ML
1500 PLASTIC BAG, INJECTION (ML) INTRAVENOUS EVERY 8 HOURS
Status: DISCONTINUED | OUTPATIENT
Start: 2018-12-19 | End: 2018-12-21 | Stop reason: HOSPADM

## 2018-12-18 RX ORDER — HYDROCODONE BITARTRATE AND ACETAMINOPHEN 5; 325 MG/1; MG/1
1 TABLET ORAL
Status: DISCONTINUED | OUTPATIENT
Start: 2018-12-18 | End: 2018-12-21 | Stop reason: HOSPADM

## 2018-12-18 RX ORDER — HYDROMORPHONE HYDROCHLORIDE 1 MG/ML
0.5 INJECTION, SOLUTION INTRAMUSCULAR; INTRAVENOUS; SUBCUTANEOUS ONCE
Status: COMPLETED | OUTPATIENT
Start: 2018-12-18 | End: 2018-12-18

## 2018-12-18 RX ORDER — HYDROMORPHONE HYDROCHLORIDE 1 MG/ML
0.2 INJECTION, SOLUTION INTRAMUSCULAR; INTRAVENOUS; SUBCUTANEOUS
Status: DISCONTINUED | OUTPATIENT
Start: 2018-12-18 | End: 2018-12-21 | Stop reason: HOSPADM

## 2018-12-18 RX ADMIN — ACETAMINOPHEN 650 MG: 325 TABLET ORAL at 16:42

## 2018-12-18 RX ADMIN — VANCOMYCIN HYDROCHLORIDE 1500 MG: 10 INJECTION, POWDER, LYOPHILIZED, FOR SOLUTION INTRAVENOUS at 16:38

## 2018-12-18 RX ADMIN — Medication 5 ML: at 05:44

## 2018-12-18 RX ADMIN — Medication 5 ML: at 20:54

## 2018-12-18 RX ADMIN — ACETAMINOPHEN 650 MG: 325 TABLET ORAL at 08:54

## 2018-12-18 RX ADMIN — VANCOMYCIN HYDROCHLORIDE 1500 MG: 10 INJECTION, POWDER, LYOPHILIZED, FOR SOLUTION INTRAVENOUS at 04:32

## 2018-12-18 RX ADMIN — HYDROMORPHONE HYDROCHLORIDE 0.5 MG: 1 INJECTION, SOLUTION INTRAMUSCULAR; INTRAVENOUS; SUBCUTANEOUS at 17:59

## 2018-12-18 RX ADMIN — Medication 10 ML: at 16:39

## 2018-12-18 RX ADMIN — ONDANSETRON 4 MG: 2 INJECTION INTRAMUSCULAR; INTRAVENOUS at 18:15

## 2018-12-18 RX ADMIN — CEFTRIAXONE SODIUM 1 G: 1 INJECTION, POWDER, FOR SOLUTION INTRAMUSCULAR; INTRAVENOUS at 17:41

## 2018-12-18 NOTE — PROGRESS NOTES
Problem: Nutrition Deficit  Goal: *Optimize nutritional status  Nutrition  Reason for assessment: Referral received from nursing admission Malnutrition Screening Tool for recently lost 14-23# without trying and eating poorly due to decreased appetite. Assessment:   Diet order(s): regular  Food/Nutrition Patient History:  Pt's family member at bedside assists with interview/interpreting. Apparently pt lost his job recently and lost ~15 pounds following. He reports a UBW of ~160 pounds. There are no previous weights listed in EMR to evaluate reported weight loss. I observed patient eating a significant amount of chocolate candy, 100% of dessert served with lunch, [place] food brought in from outside, and is saving SFD lunch for later. He denies any barriers to intake at this point. Anthropometrics:Height: 5' 6\" (167.6 cm),  Weight: 70.3 kg (155 lb),  , Body mass index is 25.02 kg/m². BMI class of borderline overweight for age. Macronutrient needs:  EER:  7214-5537 kcal /day (23-28 kcal/kg listed BW)  EPR:  52-65 grams protein/day (0.8-1 grams/kg IBW)  Intake/Comparative Standards: Per RD meal rounds: see above. No recorded meal intakes. Nutrition Diagnosis: No nutrition diagnosis. Intervention:  Meals and snacks: Continue current diet. Nutrition Supplement Therapy: none necessary   Discharge nutrition plan: No needs at discharge identified.      Ita Adams Carlos , [facility], [facility] [address], [phone]

## 2018-12-18 NOTE — PROGRESS NOTES
Pt complains of increased pain in left arm. Pt feels the swelling is worse. MD paged.  Orders received

## 2018-12-18 NOTE — PROGRESS NOTES
Progress Note    Patient: Divya Cowan MRN: [id number]  SSN: [id number]    YOB: 1975  Age: 37 y.o. Sex: male      Admit Date: 12/16/2018    LOS: 2 days     Subjective:     Admitted due to left arm swelling. Diagnosed with cellulitis. No medical problems besides smoking. 12/18:  cellulitis is improving. Less redness. Less swelling. No fever. No shaking. No chills. Objective:     Vitals:    12/17/18 1500 12/17/18 1900 12/17/18 2300 12/18/18 0300   BP: 108/68 109/62 111/62 113/69   Pulse: 96 (!) 119 100 96   Resp: 18 18 18 18   Temp: 98.5 °F (36.9 °C) 98.7 °F (37.1 °C) 98.8 °F (37.1 °C) 98.8 °F (37.1 °C)   SpO2: 98% 96% 99% 100%   Weight:       Height:            Intake and Output:  Current Shift: No intake/output data recorded. Last three shifts: 12/16 1901 - 12/18 0700  In: 1300 [P.O.:480; I.V.:820]  Out: 200 [Urine:200]    Physical Exam:     Physical Exam:     General:                    The patient is a pleasant male in no acute distress.    Head:                                   Normocephalic/atraumatic. Eyes:                                   No palpebral pallor or scleral icterus. ENT:                                    External auricular and nasal exam within normal limits.                                             OXMFQQ membranes are moist.  Neck:                                   Supple, non-tender, no JVD. Lungs:                       Clear to auscultation bilaterally without wheezes or crackles.                                             No respiratory distress or accessory muscle use. Heart:                                  Regular rate and rhythm, without murmurs, rubs, or gallops. Abdomen:                  Soft, non-tender, non-distended with normoactive bowel sounds. Genitourinary:           No tenderness over the bladder or bilateral CVAs.   Extremities:               Without clubbing, cyanosis, or edema.  Skin:                                   redness and swelling and warmth of the upper left arm are less. Less tender. No fluctuation. Pulses:                      Radial and dorsalis pedis pulses present 2+ bilaterally.                                               Capillary refill <2s. Neurologic:                CN II-XII grossly intact and symmetrical.                                               Moving all four extremities well with no focal deficits. Psychiatric:                Pleasant demeanor, appropriate affect. Alert and oriented x 3        Lab/Data Review:    No results found for this or any previous visit (from the past 24 hour(s)).   All Micro Results     None          Current Facility-Administered Medications:     vancomycin (VANCOCIN) 1500 mg in  ml infusion, 1,500 mg, IntraVENous, Q12H, Giovanni Lam MD, Last Rate: 333.3 mL/hr at 12/18/18 0432, 1,500 mg at 12/18/18 0432    sodium chloride (NS) flush 5-10 mL, 5-10 mL, IntraVENous, Q8H, Giovanni Lam MD, 5 mL at 12/18/18 0544    sodium chloride (NS) flush 5-10 mL, 5-10 mL, IntraVENous, PRN, Giovanni Lam MD, 5 mL at 12/16/18 2250    acetaminophen (TYLENOL) tablet 650 mg, 650 mg, Oral, Q6H PRN, Giovanni Lam MD, 650 mg at 12/16/18 2244    diphenhydrAMINE (BENADRYL) capsule 25 mg, 25 mg, Oral, Q6H PRN, Giovanni Lam MD    ondansetron (ZOFRAN) injection 4 mg, 4 mg, IntraVENous, Q6H PRN, Giovanni Lam MD    zolpidem (AMBIEN) tablet 5 mg, 5 mg, Oral, QHS PRN, Giovanni Lam MD    docusate sodium (COLACE) capsule 100 mg, 100 mg, Oral, DAILY PRN, Giovanni Lam MD    nicotine (NICODERM CQ) 7 mg/24 hr patch 1 Patch, 1 Patch, TransDERmal, Q24H, Giovanni Lam MD, 1 Patch at 12/17/18 1182    cefTRIAXone (ROCEPHIN) 1 g in 0.9% sodium chloride (MBP/ADV) 50 mL, 1 g, IntraVENous, Q24H, Giovanni Lam MD, Last Rate: 100 mL/hr at 12/17/18 1748, 1 g at 12/17/18 1748      Assessment:     Principal Problem:    Cellulitis (12/16/2018)    Active Problems:    Smoking (12/16/2018)        Plan:     Cellulitis  Improving  Continue current vancomycin and Ceftriaxone. Girlfriend had recent MRSA infection. Smoking  Counseled for tobacco cessation   Continue Nicotine patch. I have discussed the plan of care with patient.      DVT prophylaxis : SCD    Disposition plan : discharge to home with PO clindamycin for 10 days duration     Signed By: Aldair Tyson MD     December 18, 2018

## 2018-12-18 NOTE — PROGRESS NOTES
Pharmacokinetic Consult to Pharmacist    Nanda Morales is a 37 y.o. male being treated for left arm cellulitis with vancomycin and ceftriaxone. Height: 5' 6\" (167.6 cm)  Weight: 70.3 kg (155 lb)  Lab Results   Component Value Date/Time    BUN 9 12/16/2018 02:59 PM    Creatinine 0.76 (L) 12/16/2018 02:59 PM    WBC 12.3 (H) 12/18/2018 09:28 AM    Lactic Acid (POC) 1.01 12/16/2018 08:11 PM      Estimated Creatinine Clearance: 113.1 mL/min (A) (based on SCr of 0.76 mg/dL (L)). CULTURES:  All Micro Results     None            Day 3 of vancomycin. Goal trough is 10-20. No bmp today to assess current renal function. The vancomycin level returned subtherapeutic ~11 hours after the last dose (q12 regimen). Expect true trough tomorrow to be slightly lower. Level markedly low for this patient's previous renal function and current dose (~20 mg/kg/dose). Will increase regimen to 1500 mg q8. Pharmacy will continue to follow. Please call with any questions.     Thank you,  Kwame Rivas, PharmD, [facility]  Clinical Pharmacist  [phone]

## 2018-12-19 ENCOUNTER — APPOINTMENT (OUTPATIENT)
Dept: ULTRASOUND IMAGING | Age: 43
DRG: 603 | End: 2018-12-19
Attending: HOSPITALIST
Payer: SELF-PAY

## 2018-12-19 ENCOUNTER — ANESTHESIA EVENT (OUTPATIENT)
Dept: SURGERY | Age: 43
DRG: 603 | End: 2018-12-19
Payer: SELF-PAY

## 2018-12-19 LAB
ERYTHROCYTE [DISTWIDTH] IN BLOOD BY AUTOMATED COUNT: 13.5 % (ref 11.9–14.6)
HCT VFR BLD AUTO: 40 % (ref 41.1–50.3)
HGB BLD-MCNC: 13.1 G/DL (ref 13.6–17.2)
MCH RBC QN AUTO: 29 PG (ref 26.1–32.9)
MCHC RBC AUTO-ENTMCNC: 32.8 G/DL (ref 31.4–35)
MCV RBC AUTO: 88.5 FL (ref 79.6–97.8)
NRBC # BLD: 0 K/UL (ref 0–0.2)
PLATELET # BLD AUTO: 288 K/UL (ref 150–450)
PMV BLD AUTO: 9.1 FL (ref 9.4–12.3)
RBC # BLD AUTO: 4.52 M/UL (ref 4.23–5.6)
WBC # BLD AUTO: 13.3 K/UL (ref 4.3–11.1)

## 2018-12-19 PROCEDURE — 74011250637 HC RX REV CODE- 250/637: Performed by: INTERNAL MEDICINE

## 2018-12-19 PROCEDURE — 74011250637 HC RX REV CODE- 250/637: Performed by: HOSPITALIST

## 2018-12-19 PROCEDURE — 36415 COLL VENOUS BLD VENIPUNCTURE: CPT

## 2018-12-19 PROCEDURE — 74011250636 HC RX REV CODE- 250/636: Performed by: INTERNAL MEDICINE

## 2018-12-19 PROCEDURE — 76881 US COMPL JOINT R-T W/IMG: CPT

## 2018-12-19 PROCEDURE — 65270000029 HC RM PRIVATE

## 2018-12-19 PROCEDURE — 85027 COMPLETE CBC AUTOMATED: CPT

## 2018-12-19 PROCEDURE — 74011000258 HC RX REV CODE- 258: Performed by: INTERNAL MEDICINE

## 2018-12-19 RX ORDER — HYDROCODONE BITARTRATE AND ACETAMINOPHEN 5; 325 MG/1; MG/1
1 TABLET ORAL
Qty: 30 TAB | Refills: 0 | Status: SHIPPED | OUTPATIENT
Start: 2018-12-19

## 2018-12-19 RX ORDER — DOCUSATE SODIUM 100 MG/1
100 CAPSULE, LIQUID FILLED ORAL
Qty: 60 CAP | Refills: 0 | Status: SHIPPED | OUTPATIENT
Start: 2018-12-19 | End: 2019-03-19

## 2018-12-19 RX ORDER — CLINDAMYCIN HYDROCHLORIDE 300 MG/1
300 CAPSULE ORAL 3 TIMES DAILY
Qty: 30 CAP | Refills: 0 | Status: SHIPPED | OUTPATIENT
Start: 2018-12-19 | End: 2018-12-29

## 2018-12-19 RX ORDER — SAME BUTANEDISULFONATE/BETAINE 400-600 MG
250 POWDER IN PACKET (EA) ORAL 2 TIMES DAILY
Qty: 14 CAP | Refills: 0 | Status: SHIPPED | OUTPATIENT
Start: 2018-12-19 | End: 2018-12-26

## 2018-12-19 RX ADMIN — HYDROCODONE BITARTRATE AND ACETAMINOPHEN 1 TABLET: 5; 325 TABLET ORAL at 16:14

## 2018-12-19 RX ADMIN — CEFTRIAXONE SODIUM 1 G: 1 INJECTION, POWDER, FOR SOLUTION INTRAMUSCULAR; INTRAVENOUS at 17:06

## 2018-12-19 RX ADMIN — DOCUSATE SODIUM 100 MG: 100 CAPSULE, LIQUID FILLED ORAL at 08:18

## 2018-12-19 RX ADMIN — HYDROCODONE BITARTRATE AND ACETAMINOPHEN 1 TABLET: 5; 325 TABLET ORAL at 08:18

## 2018-12-19 RX ADMIN — Medication 5 ML: at 05:54

## 2018-12-19 RX ADMIN — VANCOMYCIN HYDROCHLORIDE 1500 MG: 10 INJECTION, POWDER, LYOPHILIZED, FOR SOLUTION INTRAVENOUS at 01:38

## 2018-12-19 RX ADMIN — VANCOMYCIN HYDROCHLORIDE 1500 MG: 10 INJECTION, POWDER, LYOPHILIZED, FOR SOLUTION INTRAVENOUS at 08:06

## 2018-12-19 RX ADMIN — Medication 10 ML: at 17:08

## 2018-12-19 RX ADMIN — VANCOMYCIN HYDROCHLORIDE 1500 MG: 10 INJECTION, POWDER, LYOPHILIZED, FOR SOLUTION INTRAVENOUS at 17:29

## 2018-12-19 NOTE — PROGRESS NOTES
available for any Interpreting services requests            Davie GREEN Ajitdavide camron  Patient [email or web address]  c: [phone] / Ines Torres / Ju, [facility]  [email or web address]. [facility]

## 2018-12-19 NOTE — PROGRESS NOTES
Progress Note    Patient: Priscila Glass MRN: [id number]  SSN: [id number]    YOB: 1975  Age: 37 y.o. Sex: male      Admit Date: 12/16/2018    LOS: 3 days     Subjective:     Admitted due to left arm cellulitis    Diagnosed with cellulitis. No medical problems besides smoking. 12/19:  Pt seen at bedside. Reports pain and area being \"tense\"   No fever, chills, night sweats. Discussed about doing an ultrasound of LUE. Objective:     Vitals:    12/18/18 1900 12/18/18 2300 12/19/18 0300 12/19/18 0705   BP: 118/63 118/64 123/66 111/70   Pulse: (!) 118 100 (!) 103 95   Resp: 18 18 18 18   Temp: 99.7 °F (37.6 °C) 98.8 °F (37.1 °C) 98.7 °F (37.1 °C) 99.1 °F (37.3 °C)   SpO2: 98% 98% 99% 99%   Weight:       Height:            Intake and Output:  Current Shift: No intake/output data recorded. Last three shifts: 12/17 1901 - 12/19 0700  In: 834 [I.V.:834]  Out: 500 [Urine:500]    Physical Exam:     Physical Exam:     General:                    The patient is a pleasant male in no acute distress.    HEENT:                    Head NCTA, PERRLA+  Lungs:                       CTAB/LB  Heart:                                  Regular rate and rhythm, without murmurs, rubs, or gallops. Abdomen:                  Soft, non-tender, non-distended with normoactive bowel sounds. Genitourinary:           No tenderness over the bladder or bilateral CVAs. Extremities:               Without clubbing, cyanosis, or edema. Skin:                            LUE appears tense and tender on palpation. Less tender. No fluctuation. Pulses:                      Radial and dorsalis pedis pulses present 2+ bilaterally.                                               Capillary refill <2s. Neurologic:                CN II-XII grossly intact and symmetrical.                                               Moving all four extremities well with no focal deficits.   Psychiatric:                Pleasant demeanor, appropriate affect.  Alert and oriented x 3        Lab/Data Review:    Recent Results (from the past 24 hour(s))   VANCOMYCIN, TROUGH    Collection Time: 12/18/18  3:26 PM   Result Value Ref Range    Vancomycin,trough 4.6 (L) 5 - 20 ug/mL   CBC W/O DIFF    Collection Time: 12/19/18  4:50 AM   Result Value Ref Range    WBC 13.3 (H) 4.3 - 11.1 K/uL    RBC 4.52 4.23 - 5.6 M/uL    HGB 13.1 (L) 13.6 - 17.2 g/dL    HCT 40.0 (L) 41.1 - 50.3 %    MCV 88.5 79.6 - 97.8 FL    MCH 29.0 26.1 - 32.9 PG    MCHC 32.8 31.4 - 35.0 g/dL    RDW 13.5 11.9 - 14.6 %    PLATELET 645 452 - 683 K/uL    MPV 9.1 (L) 9.4 - 12.3 FL    ABSOLUTE NRBC 0.00 0.0 - 0.2 K/uL     All Micro Results     None          Current Facility-Administered Medications:     [START ON 12/20/2018] Vancomycin trough reminder, , Other, ONCE, Giovanni Lam MD    vancomycin (VANCOCIN) 1500 mg in  ml infusion, 1,500 mg, IntraVENous, Q8H, Giovanni Lam MD, Last Rate: 333.3 mL/hr at 12/19/18 0806, 1,500 mg at 12/19/18 0806    HYDROmorphone (PF) (DILAUDID) injection 0.2 mg, 0.2 mg, IntraVENous, Q4H PRN, Leanne Watts MD    HYDROcodone-acetaminophen (NORCO) 5-325 mg per tablet 1 Tab, 1 Tab, Oral, Q6H PRN, Leanne Watts MD, 1 Tab at 12/19/18 0818    sodium chloride (NS) flush 5-10 mL, 5-10 mL, IntraVENous, Q8H, Giovanni Lam MD, 5 mL at 12/19/18 0554    sodium chloride (NS) flush 5-10 mL, 5-10 mL, IntraVENous, PRN, Giovanni Lam MD, 5 mL at 12/16/18 2250    acetaminophen (TYLENOL) tablet 650 mg, 650 mg, Oral, Q6H PRN, Giovanni Lam MD, 650 mg at 12/18/18 1642    diphenhydrAMINE (BENADRYL) capsule 25 mg, 25 mg, Oral, Q6H PRN, Giovanni Lam MD    ondansetron (ZOFRAN) injection 4 mg, 4 mg, IntraVENous, Q6H PRN, Giovanni Lam MD, 4 mg at 12/18/18 1815    zolpidem (AMBIEN) tablet 5 mg, 5 mg, Oral, QHS PRN, Giovanni Lam MD    docusate sodium (COLACE) capsule 100 mg, 100 mg, Oral, DAILY PRN, Carole, MD Giovanni, 100 mg at 12/19/18 0818    nicotine (NICODERM CQ) 7 mg/24 hr patch 1 Patch, 1 Patch, TransDERmal, Q24H, Giovanni Lam MD, 1 Patch at 12/18/18 1742    cefTRIAXone (ROCEPHIN) 1 g in 0.9% sodium chloride (MBP/ADV) 50 mL, 1 g, IntraVENous, Q24H, Giovanni Lam MD, Last Rate: 100 mL/hr at 12/18/18 1741, 1 g at 12/18/18 1741  Ultrasound right upper arm 12/19/2018     CLINICAL INFORMATION: Abscess     Multiple sonographic images. The show a 6.1 x 1.9 x 1.3 cm complex fluid  collection.     IMPRESSION  IMPRESSION: Complex fluid collection in the soft tissues the right upper arm. Assessment:     Principal Problem:    Cellulitis (12/16/2018)    Active Problems:    Smoking (12/16/2018)        Plan:     LUE cellulitis with abscess on USG 12/19  Surgery consulted  Continue current vancomycin and Ceftriaxone. Smoking  Counseled for tobacco cessation   Continue Nicotine patch. I have discussed the plan of care with patient.      DVT prophylaxis : SCD    Disposition plan: pending I&D    Signed By: Douglas Alfaro MD     December 19, 2018

## 2018-12-19 NOTE — PROGRESS NOTES
Uneventful shift. Hourly rounds completed throughout shift. Patient denies needs at this time. No request for pain medication this shift. Will continue to monitor and give bedside report to oncoming day shift nurse.

## 2018-12-20 ENCOUNTER — ANESTHESIA (OUTPATIENT)
Dept: SURGERY | Age: 43
DRG: 603 | End: 2018-12-20
Payer: SELF-PAY

## 2018-12-20 LAB
CREAT SERPL-MCNC: 0.6 MG/DL (ref 0.8–1.5)
ERYTHROCYTE [DISTWIDTH] IN BLOOD BY AUTOMATED COUNT: 13.6 % (ref 11.9–14.6)
HCT VFR BLD AUTO: 40 % (ref 41.1–50.3)
HGB BLD-MCNC: 13 G/DL (ref 13.6–17.2)
MCH RBC QN AUTO: 28.8 PG (ref 26.1–32.9)
MCHC RBC AUTO-ENTMCNC: 32.5 G/DL (ref 31.4–35)
MCV RBC AUTO: 88.5 FL (ref 79.6–97.8)
NRBC # BLD: 0 K/UL (ref 0–0.2)
PLATELET # BLD AUTO: 305 K/UL (ref 150–450)
PMV BLD AUTO: 8.7 FL (ref 9.4–12.3)
RBC # BLD AUTO: 4.52 M/UL (ref 4.23–5.6)
VANCOMYCIN TROUGH SERPL-MCNC: 12.5 UG/ML (ref 5–20)
WBC # BLD AUTO: 11.8 K/UL (ref 4.3–11.1)

## 2018-12-20 PROCEDURE — 77030014008 HC SPNG HEMSTAT J&J -C: Performed by: SURGERY

## 2018-12-20 PROCEDURE — 36415 COLL VENOUS BLD VENIPUNCTURE: CPT

## 2018-12-20 PROCEDURE — 74011000250 HC RX REV CODE- 250: Performed by: SURGERY

## 2018-12-20 PROCEDURE — 77030019895 HC PCKNG STRP IODO -A: Performed by: SURGERY

## 2018-12-20 PROCEDURE — 85027 COMPLETE CBC AUTOMATED: CPT

## 2018-12-20 PROCEDURE — 74011250637 HC RX REV CODE- 250/637: Performed by: HOSPITALIST

## 2018-12-20 PROCEDURE — 76060000033 HC ANESTHESIA 1 TO 1.5 HR: Performed by: SURGERY

## 2018-12-20 PROCEDURE — 87186 SC STD MICRODIL/AGAR DIL: CPT

## 2018-12-20 PROCEDURE — 77030002916 HC SUT ETHLN J&J -A: Performed by: SURGERY

## 2018-12-20 PROCEDURE — 77030018836 HC SOL IRR NACL ICUM -A: Performed by: SURGERY

## 2018-12-20 PROCEDURE — 87077 CULTURE AEROBIC IDENTIFY: CPT

## 2018-12-20 PROCEDURE — 76210000006 HC OR PH I REC 0.5 TO 1 HR: Performed by: SURGERY

## 2018-12-20 PROCEDURE — 74011250636 HC RX REV CODE- 250/636

## 2018-12-20 PROCEDURE — 74011000258 HC RX REV CODE- 258: Performed by: INTERNAL MEDICINE

## 2018-12-20 PROCEDURE — 76010000138 HC OR TIME 0.5 TO 1 HR: Performed by: SURGERY

## 2018-12-20 PROCEDURE — 74011250637 HC RX REV CODE- 250/637: Performed by: INTERNAL MEDICINE

## 2018-12-20 PROCEDURE — 74011250636 HC RX REV CODE- 250/636: Performed by: INTERNAL MEDICINE

## 2018-12-20 PROCEDURE — 65270000029 HC RM PRIVATE

## 2018-12-20 PROCEDURE — 0X930ZZ DRAINAGE OF LEFT SHOULDER REGION, OPEN APPROACH: ICD-10-PCS | Performed by: SURGERY

## 2018-12-20 PROCEDURE — 74011250637 HC RX REV CODE- 250/637: Performed by: ANESTHESIOLOGY

## 2018-12-20 PROCEDURE — 82565 ASSAY OF CREATININE: CPT

## 2018-12-20 PROCEDURE — 74011250636 HC RX REV CODE- 250/636: Performed by: ANESTHESIOLOGY

## 2018-12-20 PROCEDURE — 87205 SMEAR GRAM STAIN: CPT

## 2018-12-20 PROCEDURE — 80202 ASSAY OF VANCOMYCIN: CPT

## 2018-12-20 PROCEDURE — 77030010509 HC AIRWY LMA MSK TELE -A: Performed by: ANESTHESIOLOGY

## 2018-12-20 PROCEDURE — 77030031139 HC SUT VCRL2 J&J -A: Performed by: SURGERY

## 2018-12-20 PROCEDURE — 87075 CULTR BACTERIA EXCEPT BLOOD: CPT

## 2018-12-20 RX ORDER — SODIUM CHLORIDE, SODIUM LACTATE, POTASSIUM CHLORIDE, CALCIUM CHLORIDE 600; 310; 30; 20 MG/100ML; MG/100ML; MG/100ML; MG/100ML
100 INJECTION, SOLUTION INTRAVENOUS CONTINUOUS
Status: DISCONTINUED | OUTPATIENT
Start: 2018-12-20 | End: 2018-12-20

## 2018-12-20 RX ORDER — MIDAZOLAM HYDROCHLORIDE 1 MG/ML
2 INJECTION, SOLUTION INTRAMUSCULAR; INTRAVENOUS
Status: DISCONTINUED | OUTPATIENT
Start: 2018-12-20 | End: 2018-12-20 | Stop reason: HOSPADM

## 2018-12-20 RX ORDER — FENTANYL CITRATE 50 UG/ML
100 INJECTION, SOLUTION INTRAMUSCULAR; INTRAVENOUS ONCE
Status: DISCONTINUED | OUTPATIENT
Start: 2018-12-20 | End: 2018-12-20 | Stop reason: HOSPADM

## 2018-12-20 RX ORDER — BUPIVACAINE HYDROCHLORIDE 5 MG/ML
INJECTION, SOLUTION EPIDURAL; INTRACAUDAL AS NEEDED
Status: DISCONTINUED | OUTPATIENT
Start: 2018-12-20 | End: 2018-12-20 | Stop reason: HOSPADM

## 2018-12-20 RX ORDER — PROPOFOL 10 MG/ML
INJECTION, EMULSION INTRAVENOUS AS NEEDED
Status: DISCONTINUED | OUTPATIENT
Start: 2018-12-20 | End: 2018-12-20 | Stop reason: HOSPADM

## 2018-12-20 RX ORDER — ONDANSETRON 2 MG/ML
INJECTION INTRAMUSCULAR; INTRAVENOUS AS NEEDED
Status: DISCONTINUED | OUTPATIENT
Start: 2018-12-20 | End: 2018-12-20 | Stop reason: HOSPADM

## 2018-12-20 RX ORDER — FENTANYL CITRATE 50 UG/ML
INJECTION, SOLUTION INTRAMUSCULAR; INTRAVENOUS AS NEEDED
Status: DISCONTINUED | OUTPATIENT
Start: 2018-12-20 | End: 2018-12-20 | Stop reason: HOSPADM

## 2018-12-20 RX ORDER — OXYCODONE HYDROCHLORIDE 5 MG/1
10 TABLET ORAL
Status: COMPLETED | OUTPATIENT
Start: 2018-12-20 | End: 2018-12-20

## 2018-12-20 RX ORDER — HYDROMORPHONE HYDROCHLORIDE 2 MG/ML
0.5 INJECTION, SOLUTION INTRAMUSCULAR; INTRAVENOUS; SUBCUTANEOUS
Status: DISCONTINUED | OUTPATIENT
Start: 2018-12-20 | End: 2018-12-20

## 2018-12-20 RX ORDER — LIDOCAINE HYDROCHLORIDE 10 MG/ML
0.3 INJECTION INFILTRATION; PERINEURAL ONCE
Status: DISCONTINUED | OUTPATIENT
Start: 2018-12-20 | End: 2018-12-20 | Stop reason: HOSPADM

## 2018-12-20 RX ORDER — LIDOCAINE HYDROCHLORIDE 20 MG/ML
INJECTION, SOLUTION EPIDURAL; INFILTRATION; INTRACAUDAL; PERINEURAL AS NEEDED
Status: DISCONTINUED | OUTPATIENT
Start: 2018-12-20 | End: 2018-12-20 | Stop reason: HOSPADM

## 2018-12-20 RX ORDER — SODIUM CHLORIDE, SODIUM LACTATE, POTASSIUM CHLORIDE, CALCIUM CHLORIDE 600; 310; 30; 20 MG/100ML; MG/100ML; MG/100ML; MG/100ML
100 INJECTION, SOLUTION INTRAVENOUS CONTINUOUS
Status: DISCONTINUED | OUTPATIENT
Start: 2018-12-20 | End: 2018-12-20 | Stop reason: HOSPADM

## 2018-12-20 RX ADMIN — HYDROMORPHONE HYDROCHLORIDE 0.5 MG: 2 INJECTION, SOLUTION INTRAMUSCULAR; INTRAVENOUS; SUBCUTANEOUS at 15:06

## 2018-12-20 RX ADMIN — CEFTRIAXONE SODIUM 1 G: 1 INJECTION, POWDER, FOR SOLUTION INTRAMUSCULAR; INTRAVENOUS at 17:54

## 2018-12-20 RX ADMIN — VANCOMYCIN HYDROCHLORIDE 1500 MG: 10 INJECTION, POWDER, LYOPHILIZED, FOR SOLUTION INTRAVENOUS at 17:44

## 2018-12-20 RX ADMIN — ONDANSETRON 4 MG: 2 INJECTION INTRAMUSCULAR; INTRAVENOUS at 14:04

## 2018-12-20 RX ADMIN — LIDOCAINE HYDROCHLORIDE 100 MG: 20 INJECTION, SOLUTION EPIDURAL; INFILTRATION; INTRACAUDAL; PERINEURAL at 13:39

## 2018-12-20 RX ADMIN — HYDROMORPHONE HYDROCHLORIDE 0.5 MG: 2 INJECTION, SOLUTION INTRAMUSCULAR; INTRAVENOUS; SUBCUTANEOUS at 14:51

## 2018-12-20 RX ADMIN — HYDROCODONE BITARTRATE AND ACETAMINOPHEN 1 TABLET: 5; 325 TABLET ORAL at 00:58

## 2018-12-20 RX ADMIN — Medication 5 ML: at 15:46

## 2018-12-20 RX ADMIN — OXYCODONE HYDROCHLORIDE 10 MG: 5 TABLET ORAL at 14:51

## 2018-12-20 RX ADMIN — PROPOFOL 200 MG: 10 INJECTION, EMULSION INTRAVENOUS at 13:39

## 2018-12-20 RX ADMIN — HYDROCODONE BITARTRATE AND ACETAMINOPHEN 1 TABLET: 5; 325 TABLET ORAL at 22:05

## 2018-12-20 RX ADMIN — Medication 10 ML: at 22:09

## 2018-12-20 RX ADMIN — VANCOMYCIN HYDROCHLORIDE 1500 MG: 10 INJECTION, POWDER, LYOPHILIZED, FOR SOLUTION INTRAVENOUS at 09:28

## 2018-12-20 RX ADMIN — FENTANYL CITRATE 50 MCG: 50 INJECTION, SOLUTION INTRAMUSCULAR; INTRAVENOUS at 13:39

## 2018-12-20 RX ADMIN — VANCOMYCIN HYDROCHLORIDE 1500 MG: 10 INJECTION, POWDER, LYOPHILIZED, FOR SOLUTION INTRAVENOUS at 00:58

## 2018-12-20 RX ADMIN — SODIUM CHLORIDE, SODIUM LACTATE, POTASSIUM CHLORIDE, AND CALCIUM CHLORIDE 100 ML/HR: 600; 310; 30; 20 INJECTION, SOLUTION INTRAVENOUS at 12:25

## 2018-12-20 RX ADMIN — FENTANYL CITRATE 50 MCG: 50 INJECTION, SOLUTION INTRAMUSCULAR; INTRAVENOUS at 14:04

## 2018-12-20 RX ADMIN — Medication 10 ML: at 06:00

## 2018-12-20 NOTE — PROGRESS NOTES
Progress Note    Patient: Luca Greenwood MRN: [id number]  SSN: [id number]    YOB: 1975  Age: 37 y.o. Sex: male      Admit Date: 12/16/2018    LOS: 4 days     Subjective:     Admitted due to left arm cellulitis    Diagnosed with cellulitis. No medical problems besides smoking. 12/20:  Pt seen at bedside  Reports feeling okay, just have continuous pain in LUE. No fever, chills or night sweats    Objective:     Vitals:    12/19/18 1522 12/19/18 2015 12/19/18 2337 12/20/18 0515   BP: 126/71 117/72 125/67 118/64   Pulse: 95 (!) 101 82 81   Resp: 18 16 16 18   Temp: 98 °F (36.7 °C) 98.9 °F (37.2 °C) 98.6 °F (37 °C) 98.9 °F (37.2 °C)   SpO2: 99% 99% 96% 99%   Weight:       Height:            Intake and Output:  Current Shift: No intake/output data recorded. Last three shifts: 12/18 1901 - 12/20 0700  In: 1443 [I.V.:1443]  Out: 500 [Urine:500]    Physical Exam:     Physical Exam:     General:                    The patient is a pleasant male in no acute distress.    HEENT:                    Head NCTA, PERRLA+  Lungs:                       CTAB/LB  Heart:                                  Regular rate and rhythm, without murmurs, rubs, or gallops. Abdomen:                  Soft, non-tender, non-distended with normoactive bowel sounds. Genitourinary:           No tenderness over the bladder or bilateral CVAs. Extremities:               Without clubbing, cyanosis, or edema. Skin:                            LUE appears tense and tender on palpation. Less tender. No fluctuation. Pulses:                      Radial and dorsalis pedis pulses present 2+ bilaterally.                                               Capillary refill <2s. Neurologic:                CN II-XII grossly intact and symmetrical.                                               Moving all four extremities well with no focal deficits. Psychiatric:                Pleasant demeanor, appropriate affect.  Alert and oriented x 3        Lab/Data Review:    Recent Results (from the past 24 hour(s))   CBC W/O DIFF    Collection Time: 12/20/18  4:32 AM   Result Value Ref Range    WBC 11.8 (H) 4.3 - 11.1 K/uL    RBC 4.52 4.23 - 5.6 M/uL    HGB 13.0 (L) 13.6 - 17.2 g/dL    HCT 40.0 (L) 41.1 - 50.3 %    MCV 88.5 79.6 - 97.8 FL    MCH 28.8 26.1 - 32.9 PG    MCHC 32.5 31.4 - 35.0 g/dL    RDW 13.6 11.9 - 14.6 %    PLATELET 056 501 - 118 K/uL    MPV 8.7 (L) 9.4 - 12.3 FL    ABSOLUTE NRBC 0.00 0.0 - 0.2 K/uL   CREATININE    Collection Time: 12/20/18  4:32 AM   Result Value Ref Range    Creatinine 0.60 (L) 0.8 - 1.5 MG/DL     All Micro Results     None          Current Facility-Administered Medications:     Vancomycin trough reminder, , Other, ONCE, Giovanni Lam MD    vancomycin (VANCOCIN) 1500 mg in  ml infusion, 1,500 mg, IntraVENous, Q8H, Giovanni Lam MD, Last Rate: 333.3 mL/hr at 12/20/18 0058, 1,500 mg at 12/20/18 0058    HYDROmorphone (PF) (DILAUDID) injection 0.2 mg, 0.2 mg, IntraVENous, Q4H PRN, Shelli Lizama MD    HYDROcodone-acetaminophen (NORCO) 5-325 mg per tablet 1 Tab, 1 Tab, Oral, Q6H PRN, Shelli Lizama MD, 1 Tab at 12/20/18 0058    sodium chloride (NS) flush 5-10 mL, 5-10 mL, IntraVENous, Q8H, Giovanni Lam MD, 10 mL at 12/20/18 0600    sodium chloride (NS) flush 5-10 mL, 5-10 mL, IntraVENous, PRN, Giovanni Lam MD, 5 mL at 12/16/18 2250    acetaminophen (TYLENOL) tablet 650 mg, 650 mg, Oral, Q6H PRN, Giovanni Lam MD, 650 mg at 12/18/18 1642    diphenhydrAMINE (BENADRYL) capsule 25 mg, 25 mg, Oral, Q6H PRN, Giovanni Lam MD    ondansetron (ZOFRAN) injection 4 mg, 4 mg, IntraVENous, Q6H PRN, Giovanni Lam MD, 4 mg at 12/18/18 1815    zolpidem (AMBIEN) tablet 5 mg, 5 mg, Oral, QHS PRN, Giovanni Lam MD    docusate sodium (COLACE) capsule 100 mg, 100 mg, Oral, DAILY PRN, Giovanni Lam MD, 100 mg at 12/19/18 0818    nicotine (NICODERM CQ) 7 mg/24 hr patch 1 Patch, 1 Patch, TransDERmal, Q24H, Gloria Mercedes MD, 1 Patch at 12/19/18 1707    cefTRIAXone (ROCEPHIN) 1 g in 0.9% sodium chloride (MBP/ADV) 50 mL, 1 g, IntraVENous, Q24H, Giovanni Lam MD, Last Rate: 100 mL/hr at 12/19/18 1706, 1 g at 12/19/18 1706  Ultrasound right upper arm 12/19/2018     CLINICAL INFORMATION: Abscess     Multiple sonographic images. The show a 6.1 x 1.9 x 1.3 cm complex fluid  collection.     IMPRESSION  IMPRESSION: Complex fluid collection in the soft tissues the right upper arm. Assessment:     Principal Problem:    Cellulitis (12/16/2018)    Active Problems:    Smoking (12/16/2018)        Plan:     LUE cellulitis with abscess on USG 12/19  Surgery planning for I&D in OR today (12/20)  Continue current vancomycin and Ceftriaxone since admission 12/16    Smoking  Counseled for tobacco cessation   Continue Nicotine patch. I have discussed the plan of care with patient.      DVT prophylaxis : SCD    Disposition plan: pending I&D    Signed By: Judi Murguia MD     December 20, 2018

## 2018-12-20 NOTE — PROGRESS NOTES
TRANSFER - OUT REPORT:    Verbal report given to James Cabral RN (name) on Len Lias  being transferred to Pre-op(unit) for routine progression of care       Report consisted of patients Situation, Background, Assessment and   Recommendations(SBAR). Information from the following report(s) SBAR, Intake/Output and Recent Results was reviewed with the receiving nurse. Lines:   Peripheral IV 12/19/18 Lower;Right Forearm (Active)   Site Assessment Clean, dry, & intact 12/19/2018  8:15 PM   Phlebitis Assessment 0 12/19/2018  8:15 PM   Infiltration Assessment 0 12/19/2018  8:15 PM   Dressing Status Clean, dry, & intact 12/19/2018  8:15 PM   Dressing Type Transparent;Tape 12/19/2018  8:15 PM   Hub Color/Line Status Infusing 12/19/2018  8:15 PM        Opportunity for questions and clarification was provided.       Patient transported with:

## 2018-12-20 NOTE — ANESTHESIA POSTPROCEDURE EVALUATION
Procedure(s):  INCISION AND DRAINAGE LEFT DELTOID INTRAMUSCULAR ABSCESS.     Anesthesia Post Evaluation      Multimodal analgesia: multimodal analgesia used between 6 hours prior to anesthesia start to PACU discharge  Patient location during evaluation: PACU  Patient participation: complete - patient participated  Level of consciousness: sleepy but conscious  Pain management: adequate  Airway patency: patent  Anesthetic complications: no  Cardiovascular status: acceptable  Respiratory status: acceptable  Hydration status: acceptable  Post anesthesia nausea and vomiting:  none      Visit Vitals  /87   Pulse 96   Temp 36.8 °C (98.2 °F)   Resp 18   Ht 5' 6\" (1.676 m)   Wt 70.3 kg (155 lb)   SpO2 99%   BMI 25.02 kg/m²

## 2018-12-20 NOTE — ANESTHESIA PREPROCEDURE EVALUATION
Anesthetic History          Comments: No family problems known with GA     Review of Systems / Medical History  Patient summary reviewed and pertinent labs reviewed    Pulmonary          Smoker         Neuro/Psych   Within defined limits           Cardiovascular                  Exercise tolerance: >4 METS     GI/Hepatic/Renal  Within defined limits              Endo/Other  Within defined limits           Other Findings              Physical Exam    Airway  Mallampati: III  TM Distance: < 4 cm  Neck ROM: normal range of motion   Mouth opening: Normal    Comments:  It appears adequate, his effort on exam did not seem like a complete effort Cardiovascular    Rhythm: regular  Rate: normal         Dental  No notable dental hx       Pulmonary                 Abdominal         Other Findings            Anesthetic Plan    ASA: 2  Anesthesia type: general          Induction: Intravenous  Anesthetic plan and risks discussed with: Patient      Discussed GA with LMA

## 2018-12-20 NOTE — OP NOTES
Operative Report    Patient: Antoinette Saeed MRN: [id number]     YOB: 1975  Age: 37 y.o. Sex: male       Date of Surgery: 12/20/2018     Preoperative Diagnosis: INFECTED LEFT SHOULDER     Postoperative Diagnosis: INFECTED LEFT SHOULDER     NAME OF PROCEDURE:  Procedure(s):  INCISION AND DRAINAGE LEFT DELTOID INTRAMUSCULAR ABSCESS. SURGEON: Argelia Phillips MD    Anesthesia: General     Complications: none      Procedure Details       Informed consent was obtained and the patient was brought to the operating room and placed supine. After induction of a general anesthetic, the left deltoid region was prepped and draped sterilely. The site was inspected/probed, and FNA confirmed purulence. An incision was made over the abscess resulting in return of a medium amount of purulent material. This was subfascial, within deltoid fibers. The area was irrigated, infiltrated with local, and packed with iodoform gauze. Cautery was used liberally to minimize oozing. The patient tolerated the procedure well with no apparent complications and was awakened from anesthesia and extubated in the operating room and taken to the recovery room in satisfactory condition.      Estimated Blood Loss: per anesthesia           Specimens:   ID Type Source Tests Collected by Time Destination   1 : LEFT ARM FLUID Body Fluid  CULTURE, ANAEROBIC, CULTURE, BODY FLUID, GRAM STAIN Katia Coon MD 12/20/2018 4556 Microbiology           Signed By:  Twin Mitchell MD     December 20, 2018

## 2018-12-20 NOTE — ROUTINE PROCESS
TRANSFER - OUT REPORT:    Verbal report given to  on Valentin Mcguire Zelaya  being transferred to [facility] for routine post - op       Report consisted of patients Situation, Background, Assessment and   Recommendations(SBAR). Information from the following report(s) Procedure Summary and Cardiac Rhythm NSR was reviewed with the receiving nurse. Lines:   Peripheral IV 12/19/18 Lower;Right Forearm (Active)   Site Assessment Clean, dry, & intact 12/20/2018  3:13 PM   Phlebitis Assessment 0 12/20/2018  3:13 PM   Infiltration Assessment 0 12/20/2018  3:13 PM   Dressing Status Clean, dry, & intact 12/20/2018  2:37 PM   Dressing Type Transparent;Tape 12/20/2018  3:13 PM   Hub Color/Line Status Blue;Capped 12/20/2018  3:13 PM       Peripheral IV 12/20/18 Right Forearm (Active)   Site Assessment Clean, dry, & intact 12/20/2018  3:13 PM   Phlebitis Assessment 0 12/20/2018  3:13 PM   Infiltration Assessment 0 12/20/2018  3:13 PM   Dressing Status Clean, dry, & intact 12/20/2018  3:13 PM   Dressing Type Transparent;Tape 12/20/2018  3:13 PM   Hub Color/Line Status Green; Infusing 12/20/2018  3:13 PM   Alcohol Cap Used No 12/20/2018  2:37 PM        Opportunity for questions and clarification was provided. Patient transported with:   Tech    VTE prophylaxis orders have been written for [facility] [place]. Patient and family given floor number and nurses name. Family updated re: pt status after security code verified.

## 2018-12-20 NOTE — PROGRESS NOTES
TRANSFER - IN REPORT:    Verbal report received from Agustin Graham RN  (name) on Loa Dakin  being received from PACU (unit) for routine post - op      Report consisted of patients Situation, Background, Assessment and   Recommendations(SBAR). Information from the following report(s) SBAR, Kardex, OR Summary, Procedure Summary, Intake/Output, MAR and Recent Results was reviewed with the receiving nurse. Opportunity for questions and clarification was provided. Assessment completed upon patients arrival to unit and care assumed.

## 2018-12-20 NOTE — PROGRESS NOTES
present at bedside in pre-op unit  During assessments with unit nurse and Dr. Bogdan Vaca and signature of consents. Tharon Crystal [facility] Calleen Hammans Kentrell Lynne  Patient [email or web address]  c: [phone] / Ines Romeo 68 / Ju, [facility]  [email or web address]. [facility]

## 2018-12-20 NOTE — PROGRESS NOTES
Pharmacokinetic Consult to Pharmacist    Divya Chapo is a 37 y.o. male being treated for left arm cellulitis with abscess with vancomycin and ceftriaxone. Height: 5' 6\" (167.6 cm)  Weight: 70.3 kg (155 lb)  Lab Results   Component Value Date/Time    BUN 9 12/16/2018 02:59 PM    Creatinine 0.60 (L) 12/20/2018 04:32 AM    WBC 11.8 (H) 12/20/2018 04:32 AM    Lactic Acid (POC) 1.01 12/16/2018 08:11 PM      Estimated Creatinine Clearance: 143.3 mL/min (A) (based on SCr of 0.6 mg/dL (L)). CULTURES:  All Micro Results     None            Day 5 of vancomycin. Goal trough is 10-20. Renal function remains stable. The vancomycin trough returned within the therapeutic range ~7 hours after the last dose (q8h regimen). Expect the true trough to be slightly lower but within therapeutic range. Will continue current regimen for now. EOT 12/23    Pharmacy will continue to follow. Please call with any questions.     Thank you,  Ronan Velarde, PharmD, [facility]  Clinical Pharmacist  [phone]

## 2018-12-20 NOTE — PROGRESS NOTES
I&D performed  Cultures obtained  He can be discharged per primary service- Rx for norco on chart  He should start daily packing changes of the wound on Sunday 12/23  He can f/u with me or the wound center in 10 days.

## 2018-12-20 NOTE — PROGRESS NOTES
TRANSFER - IN REPORT:    Verbal report received from [facility], [address] [place] [address] (name) on Constantino Ro  being received from 2nd floor (unit) for ordered procedure      Report consisted of patients Situation, Background, Assessment and   Recommendations(SBAR). Information from the following report(s) Kardex, Procedure Summary, Intake/Output, MAR, Recent Results, Med Rec Status and Procedure Verification was reviewed with the receiving nurse. Opportunity for questions and clarification was provided. Assessment completed upon patients arrival to unit and care assumed.

## 2018-12-21 VITALS
BODY MASS INDEX: 24.91 KG/M2 | TEMPERATURE: 98.2 F | WEIGHT: 155 LBS | HEIGHT: 66 IN | DIASTOLIC BLOOD PRESSURE: 71 MMHG | SYSTOLIC BLOOD PRESSURE: 127 MMHG | OXYGEN SATURATION: 93 % | HEART RATE: 99 BPM | RESPIRATION RATE: 18 BRPM

## 2018-12-21 PROCEDURE — 90471 IMMUNIZATION ADMIN: CPT

## 2018-12-21 PROCEDURE — 90686 IIV4 VACC NO PRSV 0.5 ML IM: CPT | Performed by: INTERNAL MEDICINE

## 2018-12-21 PROCEDURE — 74011250636 HC RX REV CODE- 250/636: Performed by: INTERNAL MEDICINE

## 2018-12-21 PROCEDURE — 74011250637 HC RX REV CODE- 250/637: Performed by: HOSPITALIST

## 2018-12-21 PROCEDURE — 77030019895 HC PCKNG STRP IODO -A

## 2018-12-21 RX ADMIN — VANCOMYCIN HYDROCHLORIDE 1500 MG: 10 INJECTION, POWDER, LYOPHILIZED, FOR SOLUTION INTRAVENOUS at 01:29

## 2018-12-21 RX ADMIN — INFLUENZA VIRUS VACCINE 0.5 ML: 15; 15; 15; 15 SUSPENSION INTRAMUSCULAR at 12:19

## 2018-12-21 RX ADMIN — VANCOMYCIN HYDROCHLORIDE 1500 MG: 10 INJECTION, POWDER, LYOPHILIZED, FOR SOLUTION INTRAVENOUS at 08:43

## 2018-12-21 RX ADMIN — HYDROCODONE BITARTRATE AND ACETAMINOPHEN 1 TABLET: 5; 325 TABLET ORAL at 05:53

## 2018-12-21 RX ADMIN — HYDROCODONE BITARTRATE AND ACETAMINOPHEN 1 TABLET: 5; 325 TABLET ORAL at 12:53

## 2018-12-21 NOTE — DISCHARGE INSTRUCTIONS
DISCHARGE SUMMARY from Nurse    He should start daily packing changes of the wound on Sunday 12/23. Pack with Iodoform gauze, cover with gauze and abd pad and tape. He can f/u with Dr. Paresh Diaz or the wound center in 10 days. PATIENT INSTRUCTIONS:    After general anesthesia or intravenous sedation, for 24 hours or while taking prescription Narcotics:  · Limit your activities  · Do not drive and operate hazardous machinery  · Do not make important personal or business decisions  · Do  not drink alcoholic beverages  · If you have not urinated within 8 hours after discharge, please contact your surgeon on call. Report the following to your surgeon:  · Excessive pain, swelling, redness or odor of or around the surgical area  · Temperature over 100.5  · Nausea and vomiting lasting longer than 4 hours or if unable to take medications  · Any signs of decreased circulation or nerve impairment to extremity: change in color, persistent  numbness, tingling, coldness or increase pain  · Any questions    What to do at Home:  Recommended activity: Activity as tolerated, per MD instructions    If you experience any of the following symptoms fever > 100.5, worsening of cellulitis symptoms, nausea, vomiting, pain, chest pain and/or shortness of breath to ER please follow up with MD.    *  Please give a list of your current medications to your Primary Care Provider. *  Please update this list whenever your medications are discontinued, doses are      changed, or new medications (including over-the-counter products) are added. *  Please carry medication information at all times in case of emergency situations. These are general instructions for a healthy lifestyle:    No smoking/ No tobacco products/ Avoid exposure to second hand smoke  Surgeon General's Warning:  Quitting smoking now greatly reduces serious risk to your health.     Obesity, smoking, and sedentary lifestyle greatly increases your risk for illness    A healthy diet, regular physical exercise & weight monitoring are important for maintaining a healthy lifestyle    You may be retaining fluid if you have a history of heart failure or if you experience any of the following symptoms:  Weight gain of 3 pounds or more overnight or 5 pounds in a week, increased swelling in our hands or feet or shortness of breath while lying flat in bed. Please call your doctor as soon as you notice any of these symptoms; do not wait until your next office visit. Recognize signs and symptoms of STROKE:    F-face looks uneven    A-arms unable to move or move unevenly    S-speech slurred or non-existent    T-time-call 911 as soon as signs and symptoms begin-DO NOT go       Back to bed or wait to see if you get better-TIME IS BRAIN. Warning Signs of HEART ATTACK     Call 911 if you have these symptoms:   Chest discomfort. Most heart attacks involve discomfort in the center of the chest that lasts more than a few minutes, or that goes away and comes back. It can feel like uncomfortable pressure, squeezing, fullness, or pain.  Discomfort in other areas of the upper body. Symptoms can include pain or discomfort in one or both arms, the back, neck, jaw, or stomach.  Shortness of breath with or without chest discomfort.  Other signs may include breaking out in a cold sweat, nausea, or lightheadedness. Don't wait more than five minutes to call 911 - MINUTES MATTER! Fast action can save your life. Calling 911 is almost always the fastest way to get lifesaving treatment. Emergency Medical Services staff can begin treatment when they arrive -- up to an hour sooner than if someone gets to the hospital by car. The discharge information has been reviewed with the patient. The patient verbalized understanding.   Discharge medications reviewed with the patient and appropriate educational materials and side effects teaching were provided. ___________________________________________________________________________________________________________________________________           Cellulitis: Care Instructions  Your Care Instructions    Cellulitis is a skin infection caused by bacteria, most often strep or staph. It often occurs after a break in the skin from a scrape, cut, bite, or puncture, or after a rash. Cellulitis may be treated without doing tests to find out what caused it. But your doctor may do tests, if needed, to look for a specific bacteria, like methicillin-resistant Staphylococcus aureus (MRSA). The doctor has checked you carefully, but problems can develop later. If you notice any problems or new symptoms, get medical treatment right away. Follow-up care is a key part of your treatment and safety. Be sure to make and go to all appointments, and call your doctor if you are having problems. It's also a good idea to know your test results and keep a list of the medicines you take. How can you care for yourself at home? · Take your antibiotics as directed. Do not stop taking them just because you feel better. You need to take the full course of antibiotics. · Prop up the infected area on pillows to reduce pain and swelling. Try to keep the area above the level of your heart as often as you can. · If your doctor told you how to care for your wound, follow your doctor's instructions. If you did not get instructions, follow this general advice:  ? Wash the wound with clean water 2 times a day. Don't use hydrogen peroxide or alcohol, which can slow healing. ? You may cover the wound with a thin layer of petroleum jelly, such as Vaseline, and a nonstick bandage. ? Apply more petroleum jelly and replace the bandage as needed. · Be safe with medicines. Take pain medicines exactly as directed. ? If the doctor gave you a prescription medicine for pain, take it as prescribed.   ? If you are not taking a prescription pain medicine, ask your doctor if you can take an over-the-counter medicine. To prevent cellulitis in the future  · Try to prevent cuts, scrapes, or other injuries to your skin. Cellulitis most often occurs where there is a break in the skin. · If you get a scrape, cut, mild burn, or bite, wash the wound with clean water as soon as you can to help avoid infection. Don't use hydrogen peroxide or alcohol, which can slow healing. · If you have swelling in your legs (edema), support stockings and good skin care may help prevent leg sores and cellulitis. · Take care of your feet, especially if you have diabetes or other conditions that increase the risk of infection. Wear shoes and socks. Do not go barefoot. If you have athlete's foot or other skin problems on your feet, talk to your doctor about how to treat them. When should you call for help? Call your doctor now or seek immediate medical care if:    · You have signs that your infection is getting worse, such as:  ? Increased pain, swelling, warmth, or redness. ? Red streaks leading from the area. ? Pus draining from the area. ? A fever.     · You get a rash.    Watch closely for changes in your health, and be sure to contact your doctor if:    · You do not get better as expected. Where can you learn more? Go to http://kuldip-alyx.info/. Martin Sanford in the search box to learn more about \"Cellulitis: Care Instructions. \"  Current as of: April 18, 2018  Content Version: 11.8  © 9751-4433 Healthwise, Incorporated. Care instructions adapted under license by [employer] (which disclaims liability or warranty for this information). If you have questions about a medical condition or this instruction, always ask your healthcare professional. [place] [address] any warranty or liability for your use of this information.            Celulitis: Instrucciones de cuidado - [ Cellulitis: Care Instructions ]  Instrucciones de [address] celulitis es brian infección cutánea causada por bacterias, con mayor frecuencia estreptococos o estafilococos. Suele producirse tras brian ruptura en la piel por brian raspadura, gab, mordedura o punción, o tras un salpullido. La celulitis puede tratarse sin hacer pruebas para detectar banuelos causa. Colleen banuelos médico podría hacer pruebas, si es necesario, para detectar bacterias específicas, vickie Staphylococcus aureus resistente a la meticilina (MRSA, por key siglas en inglés). El médico lo rice examinado minuciosamente, colleen pueden presentarse problemas más tarde. Si nota algún problema o nuevos síntomas, busque tratamiento médico de inmediato. La atención de seguimiento es brian parte clave de banuelos tratamiento y seguridad. Asegúrese de hacer y acudir a todas las citas, y llame a banuelos médico si está teniendo problemas. También es brian buena idea saber los resultados de key exámenes y mantener brian lista de los medicamentos que glen. ¿Cómo puede cuidarse en el hogar? · [place] key antibióticos de la manera indicada. No deje de tomarlos solo porque se sienta mejor. Debe joya todos los antibióticos hasta terminarlos. · Eleve la ankit infectada sobre brian almohada para reducir el dolor y la hinchazón. Trate de mantener la ankit por encima del nivel del corazón tan a menudo vickie sea posible. · Si banuelos médico le dijo cómo cuidarse la herida, siga las instrucciones de banuelos médico. Si no le abigail instrucciones, siga estos consejos generales:  ? Lávese la herida con agua limpia 2 veces al día. No use peróxido de hidrógeno (agua [place]) ni alcohol, los cuales pueden retrasar la sanación. ? Puede cubrirse la herida con brian capa delgada de vaselina y Shashank Lopez venda no adherente. ? Aplíquese más vaselina y reemplace la venda según sea necesario. · Sea gael con los medicamentos. [place] los analgésicos (medicamentos para el dolor) exactamente vickie le fueron indicados. ? Si el [employer] abigail un analgésico recetado, tómelo vickie se lo indicó. ?  Si usted no está tomando un analgésico recetado, pregúntele a banuelos médico si puede joya un medicamento de [facility]. Para prevenir la celulitis en el futuro  · Trate de evitar los barnett, los rasguños u otras lesiones en la piel. La celulitis suele ocurrir con mayor frecuencia donde haya brian ruptura de la piel. · Si tiene [place], gab, [place] leve o mordedura, lávese la herida con agua limpia lo antes que pueda para ayudar a evitar brian infección. No use peróxido de hidrógeno (agua [place]) ni alcohol, los cuales pueden retrasar la sanación. · Si tiene hinchazón en las piernas (edema), el uso de medias de soporte y un buen cuidado de la piel pueden ayudarle a prevenir llagas en la piel y celulitis. · Cuide de key pies, sobre todo si tiene diabetes u otras afecciones que aumenten el riesgo de [place]. Use zapatos y calcetines. No camine descalzo. Si tiene pie de atleta u otros problemas cutáneos en los pies, hable con banuelos médico de cómo tratarlos. ¿Cuándo debe pedir ayuda? Llame a banuelos médico ahora mismo o busque atención médica inmediata si:    · Tiene señales de que la infección está empeorando, tales vickie:  ? Aumento del dolor, la hinchazón, la temperatura o el enrojecimiento. ? Vetas rojizas que salen de la ankit. ? Pus que sale de la ankit. ? Nickie [place].     · Tiene un salpullido.    Preste especial atención a los cambios en banuelos jhon y asegúrese de comunicarse con banuelos médico si:    · No mejora vickie se esperaba. ¿Dónde puede encontrar más información en inglés? Oksana reyes http://kuldip-alyx.info/. Dinah Delgadillo en la búsqueda para aprender más acerca de \"Celulitis: Instrucciones de cuidado - [ Cellulitis: Care Instructions ]. \"  Revisado: 18 jarvis, 2018  Versión del contenido: 11.8  © 3469-7867 Healthwise, [employer]. Las instrucciones de cuidado fueron adaptadas bajo licencia por [employer] (which disclaims liability or warranty for this information).  Si usted tiene preguntas sobre brian afección médica o sobre estas instrucciones, siempre pregunte a banuelos profesional de jhon. Healthwise, Incorporated niega toda garantía o responsabilidad por banuelos uso de esta información. Aprenda acerca de los beneficios de dejar de fumar - [ Learning About Benefits From Quitting Smoking ]  ¿Cómo mejora banuelos jhon al dejar de fumar? Si usted está pensando en dejar de fumar, es posible que tenga unas cuantas razones liberarse del tabaco. Banuelos jhon puede ser brian de ellas. · Cuando usted hunter de fumar, reduce banuelos riesgo de cáncer, enfermedades pulmonares, ataque cardíaco, ataque cerebral, enfermedades de los vasos sanguíneos y ceguera causada por la degeneración macular. · Cuando usted no fuma, se enferma con menos frecuencia y [employer] rápido. Tiene menos probabilidades de tener resfriados, gripe, bronquitis y neumonía. · Radha no fumador, es posible que banuelos estado de ánimo sea mejor y que esté menos estresado. ¿Cómo y cuándo se sentirá más saludable? Dejar de fumar tiene beneficios reales para la jhon que comienzan a partir del mismo día en que abandona el hábito. Y cuanto más tiempo permanezca dori de tabaco, mejor será banuelos jhon y mejor se sentirá usted. Las primeras horas  · Después de solo 20 minutos, descienden la presión arterial y la frecuencia cardíaca. Katherine significa que el corazón y los vasos sanguíneos tienen que esforzarse menos. · Dentro de las 12 horas, el nivel de monóxido de carbono en la nancy desciende a banuelos nivel normal. Katherine crea espacio para más oxígeno. Con más oxígeno en el cuerpo, usted puede notar que tiene más energía que cuando fumaba. Después de 2 semanas  · Los pulmones empiezan a funcionar mejor. · Banuelos riesgo de ataque al corazón Sharlotte Stutsman a reducirse. Después de 1 mes  · NEYMAR Saldana están libres de humo, usted tose menos y respira con mayor profundidad, de modo que es más fácil estar [place]. · Usted recupera banuelos sentido del gusto y del olfato.  Katherine significa que usted puede disfrutar más de la comida de lo que lo hizo desde que comenzó a fumar. Con el tiempo  · Después de 1 año, el riesgo de enfermedad cardíaca es la mitad de lo que sería si usted siguiese fumando. · Después de 5 años, el riesgo de ataque cerebral comienza a reducirse. Unos pocos años después de eso, el riesgo es aproximadamente el mismo que si nunca hubiera fumado. · Después de 10 años, el riesgo de morir de cáncer de pulmón se reduce a la mitad. Y el riesgo de muchos otros tipos de cáncer también es rosaura. ¿De qué Benjaman Bouche ayuda a otras personas en banuelos wally el hecho de que usted deje de fumar? Cuando usted hunter de fumar, mejora la jhon de todas las personas que ahora respiran el humo de banuelos tabaco.  · Michelle riesgos cardíacos, pulmonares y de cáncer disminuyen, de la misma manera que lo [facility] suyos propios. · Ellos se enfermarán menos. Para los bebés y los niños pequeños, vivir en un ambiente sin humo significa que tendrán menos probabilidades de tener infecciones de oído, neumonía y bronquitis. · Si usted es navdeep y está embarazada o planea estarlo algún día, dejar de fumar significa que banuelos recién nacido estará más leah. · Los niños que están cerca de usted tendrán menos probabilidades de convertirse en fumadores adultos. ¿Dónde puede encontrar más información en inglés? José Andersen a http://cookie.info/. [address] en la búsqueda para aprender más acerca de \"Aprenda acerca de los beneficios de dejar de fumar - [ Learning About Benefits From Quitting Smoking ]. \"  Revisado: 29 noviembre, 2017  Versión del contenido: 11.8  © 1851-2144 Healthwise, [employer]. Las instrucciones de cuidado fueron adaptadas bajo licencia por [employer] (which disclaims liability or warranty for this information). Si usted tiene [place] afección médica o sobre estas instrucciones, siempre pregunte a banuelos profesional de jhon.  [employer] niega toda garantía o responsabilidad por banuelos uso de esta información.

## 2018-12-21 NOTE — DISCHARGE SUMMARY
Discharge Summary   Patient ID:  Saran Escamilla  [id number]  53 y.o.  1975  Admit date: 12/16/2018  4:33 PM  Discharge date and time: 12/21/2018  Attending: Rakesh Couch MD  PCP:  None  Treatment Team: Attending Provider: Rakesh Couch MD; Care Manager: Sheila Mitchell Consulting Provider: Shruthi Sinclair MD  Principal Diagnosis Cellulitis   Principal Problem:    Cellulitis (12/16/2018)    Active Problems:    Smoking (12/16/2018)       * Admission Diagnoses: Cellulitis  * Discharge Diagnoses:    Hospital Problems as of 12/21/2018 Never Reviewed          Codes Class Noted - Resolved POA    * (Principal) Cellulitis ICD-10-CM: L03.90  ICD-9-CM: 682.9  12/16/2018 - Present Unknown        Smoking ICD-10-CM: F17.200  ICD-9-CM: 305.1  12/16/2018 - Present Unknown                Hospital Course:  Mr. Priya Silva is a 38 yo male with no significant PMH who presented with c/o LUE arm pain and redness X1 days. LUE US shows complex fluid collection soft tissue. Pt started on Rocephin and Vanc. Pt underwent I&D on  12/20, cx sent growing moderate staph. General Surgery recommends daily packing of wound starting Sunday 12/23. Case Management setting up f/u with wound care, PCP, Surgery. Discussed with pt and family, they report they are able to afford f/u and rx. Abx vouchered for pt to ensure compliance. PT denies CP, SOB, n/v/d, abd pain. Has been afebrile. Diagnostic Study/Procedure results summary copied from within [facility] EMR:  1 Day Post-Op  Procedure(s):  INCISION AND DRAINAGE LEFT DELTOID INTRAMUSCULAR ABSCESS    Ultrasound right upper arm 12/19/2018     CLINICAL INFORMATION: Abscess     Multiple sonographic images.  The show a 6.1 x 1.9 x 1.3 cm complex fluid  collection.     IMPRESSION  IMPRESSION: Complex fluid collection in the soft tissues the right upper arm      Labs: Results:       Chemistry Recent Labs     12/20/18  0432   CREA 0.60* CBC w/Diff Recent Labs     12/20/18  0432 12/19/18  0450   WBC 11.8* 13.3*   RBC 4.52 4.52   HGB 13.0* 13.1*   HCT 40.0* 40.0*    288      Cardiac Enzymes No results for input(s): CPK, CKND1, GIANLUCA in the last 72 hours. No lab exists for component: CKRMB, TROIP   Coagulation No results for input(s): PTP, INR, APTT in the last 72 hours. No lab exists for component: INREXT    Lipid Panel @BRIEFLAB(CHOL,CHOLPOCT,429016,208614,[id number],CHOLX,CHOLP,CHLST,CHOLV,875302,HDL,HDLPOC,HDLPOCT,819237,NHDLCT,[id number],HDLC,HDLP,LDL,LDLPOCT,LDLCPOC,360237,NLDLCT,DLDL,LDLC,DLDLP,574058,VLDLC,VLDL,TGL,TGLX,TRIGL,[id number],TRIGP,TGLPOCT,135106,154355,CHHD,CHHDX)@   BNP No results for input(s): BNPP in the last 72 hours. Liver Enzymes No results for input(s): TP, ALB, TBIL, AP, SGOT, GPT in the last 72 hours. No lab exists for component: DBIL   Thyroid Studies No results found for: T4, T3U, TSH, TSHEXT         Discharge Exam:  Visit Vitals  /71 (BP 1 Location: Right arm, BP Patient Position: At rest)   Pulse 99   Temp 98.2 °F (36.8 °C)   Resp 18   Ht 5' 6\" (1.676 m)   Wt 70.3 kg (155 lb)   SpO2 93% Comment: ra   BMI 25.02 kg/m²     General appearance: alert, cooperative, no distress, appears stated age  Lungs: clear to auscultation bilaterally, Resp even and nonlabored  Heart: regular rate and rhythm, S1S2 present without murmurs rubs gallops. No edema  Abdomen: soft, non-tender. Bowel sounds normal. Non distended  Extremities: no cyanosis. Left shoulder/UE with dressing dry/intact. Moves ext spontaneously. Neuro:  A/O X4. No focal deficits. Disposition: home  Discharge Condition: stable  Patient Instructions:   Current Discharge Medication List      START taking these medications    Details   docusate sodium (COLACE) 100 mg capsule Take 1 Cap by mouth daily as needed for Constipation for up to 90 days.   Qty: 60 Cap, Refills: 0      HYDROcodone-acetaminophen (NORCO) 5-325 mg per tablet Take 1 Tab by mouth every six (6) hours as needed. Max Daily Amount: 4 Tabs. Qty: 30 Tab, Refills: 0    Associated Diagnoses: Cellulitis of left upper extremity      clindamycin (CLEOCIN) 300 mg capsule Take 1 Cap by mouth three (3) times daily for 10 days. Qty: 30 Cap, Refills: 0      Saccharomyces boulardii (FLORASTOR) 250 mg capsule Take 1 Cap by mouth two (2) times a day for 7 days. Qty: 14 Cap, Refills: 0             Activity: Activity as tolerated and No driving while on analgesics  Diet: Regular Diet  Wound Care: wound packing daily starting 12/23/18 at wound care clinic. Full Code   Surrogate decision maker: none reported    Pneumonia and flu vaccine to be administered at discharge per hospital protocol     Follow-up  ·   Case Management assisting in establishing PCP  ·   General Surgery 10 days  · Clindamycin rx given and voucher given    Notes, labs, VS, diagnostic testing reviewed  Case discussed with pt, care team, Dr. Ulysses Foyer, family at bedside.       Time spent to discharge patient  45 min    Signed:  Kristi Stein NP  12/21/2018  11:32 AM

## 2018-12-21 NOTE — PROGRESS NOTES
available for any Interpreting services requests               Sonja [address]  Patient [address]  c: [phone] / [email or web address]

## 2018-12-21 NOTE — PROGRESS NOTES
Pt to discharge home this day via transport from her girlfriend. This CM provided pt and girlfriend with voucher for clindamycin for [address] [place] [address] as well as informational resources on [employer] and [employer] - both verbalized understanding. CM faxed prescriptions to [address] [place] [address]. NP reported to this CM that pt is aware and agreeable to pay out of pocket for his prescriptions that can not be voucher'ed, a follow up MD appointment, as well as a follow up appointment for wound care center and MD - both pt and girlfriend confirm that they are agreeable to that information to this CM. Per MD, he wanted pt to be seen at wound care center on 12/23 but this CM called wound care center this day and they informed me that they are not open on Sundays. They let me know that the next earliest appointment they have for a new referral is the closer to the new year. This CM did send information for new referral.      No other needs verbalized at this time. Milestones met.

## 2018-12-21 NOTE — PROGRESS NOTES
Discharge instructions provided to patient and girlfriend at bedside. Teaching done on packing and dressing change. Supplies provided. Also provided with prescriptions and medication voucher for antibiotics. All questions answered.

## 2018-12-21 NOTE — PROGRESS NOTES
END OF SHIFT NOTE:    INTAKE/OUTPUT  12/20 0701 - 12/21 0700  In: 8019 [P.O.:125; I.V.:900]  Out: 100   Voiding: YES  Catheter: NO  Drain:              Flatus: Patient does have flatus present. Stool:  0 occurrences. Characteristics:  Stool Assessment  Stool Color: (have not observed)    Emesis: 0 occurrences. Characteristics:        VITAL SIGNS  Patient Vitals for the past 12 hrs:   Temp Pulse Resp BP SpO2   12/21/18 0206 98.6 °F (37 °C) 98 12 107/64 97 %   12/20/18 2235 98.4 °F (36.9 °C) (!) 101 16 114/68 97 %   12/20/18 1939 98.3 °F (36.8 °C) 100 15 112/55 99 %       Pain Assessment  Pain Intensity 1: 10 (12/21/18 0554)  Pain Location 1: Arm  Pain Intervention(s) 1: Medication (see MAR)  Patient Stated Pain Goal: 0    Ambulating  No    Shift report given to oncoming nurse at the bedside.     [address]

## 2018-12-26 LAB
BACTERIA SPEC CULT: ABNORMAL
BACTERIA SPEC CULT: ABNORMAL
GRAM STN SPEC: ABNORMAL
GRAM STN SPEC: ABNORMAL
SERVICE CMNT-IMP: ABNORMAL

## 2018-12-28 LAB
BACTERIA SPEC CULT: NORMAL
SERVICE CMNT-IMP: NORMAL

## 2023-04-01 NOTE — CONSULTS
H&P/Consult Note/Progress Note/Office Note:   John Roberson  MRN: [id number]  :1975  Age:43 y.o.    HPI: John Roberson is a 37 y.o. Nepali speaking male who we are asked by Dr. Yusra Mooney to see for left upper extremity. He came in to the ER on 18 with complaints of pain and edema in LUE that began 5 days ago. He reports subjective fever, no chills. An ultrasound was obtained that shows a complex fluid collection measuring 6.1 x 1.9 x 1.3 cm. He is on Rocephin and Vanco. WBC 13.3. LUE US 18:  CLINICAL INFORMATION: Abscess     Multiple sonographic images. The show a 6.1 x 1.9 x 1.3 cm complex fluid  collection. No past medical history on file. No past surgical history on file. Current Facility-Administered Medications   Medication Dose Route Frequency    [START ON 2018] Vancomycin trough reminder   Other ONCE    vancomycin (VANCOCIN) 1500 mg in  ml infusion  1,500 mg IntraVENous Q8H    HYDROmorphone (PF) (DILAUDID) injection 0.2 mg  0.2 mg IntraVENous Q4H PRN    HYDROcodone-acetaminophen (NORCO) 5-325 mg per tablet 1 Tab  1 Tab Oral Q6H PRN    sodium chloride (NS) flush 5-10 mL  5-10 mL IntraVENous Q8H    sodium chloride (NS) flush 5-10 mL  5-10 mL IntraVENous PRN    acetaminophen (TYLENOL) tablet 650 mg  650 mg Oral Q6H PRN    diphenhydrAMINE (BENADRYL) capsule 25 mg  25 mg Oral Q6H PRN    ondansetron (ZOFRAN) injection 4 mg  4 mg IntraVENous Q6H PRN    zolpidem (AMBIEN) tablet 5 mg  5 mg Oral QHS PRN    docusate sodium (COLACE) capsule 100 mg  100 mg Oral DAILY PRN    nicotine (NICODERM CQ) 7 mg/24 hr patch 1 Patch  1 Patch TransDERmal Q24H    cefTRIAXone (ROCEPHIN) 1 g in 0.9% sodium chloride (MBP/ADV) 50 mL  1 g IntraVENous Q24H     Patient has no known allergies.   Social History     Socioeconomic History    Marital status: SINGLE     Spouse name: Not on file    Number of children: Not on file    Years of education: Not on file    Highest education level: Not on file     Social History     Tobacco Use   Smoking Status Not on file     No family history on file. ROS: The patient has no difficulty with chest pain or shortness of breath.  + fever, - chills. Comprehensive review of systems was otherwise unremarkable except as noted above. Physical Exam:   Visit Vitals  /70   Pulse 95   Temp 99.1 °F (37.3 °C)   Resp 18   Ht 5' 6\" (1.676 m)   Wt 155 lb (70.3 kg)   SpO2 99%   BMI 25.02 kg/m²     Constitutional: Alert, oriented, cooperative patient in no acute distress; appears stated age    Eyes:Sclera are clear. EOMs intact  ENMT: no external lesions gross hearing normal; no obvious neck masses, no ear or lip lesions, nares normal  CV: RRR. Normal perfusion  Resp: No JVD. Breathing is  non-labored; no audible wheezing. CTAB. GI: soft and non-distended, non tender    Musculoskeletal: LUE is erythematous, warm, very tender, and tense. No well defined palpable fluid collection or fluctuance noted. No embolic signs or cyanosis. Normal ROM of left arm.   Neuro:  Oriented; moves all 4; no focal deficits  Psychiatric: normal affect and mood, no memory impairment    Recent vitals (if inpt):  Patient Vitals for the past 24 hrs:   BP Temp Pulse Resp SpO2   12/19/18 0705 111/70 99.1 °F (37.3 °C) 95 18 99 %   12/19/18 0300 123/66 98.7 °F (37.1 °C) (!) 103 18 99 %   12/18/18 2300 118/64 98.8 °F (37.1 °C) 100 18 98 %   12/18/18 1900 118/63 99.7 °F (37.6 °C) (!) 118 18 98 %   12/18/18 1521 128/72 98.5 °F (36.9 °C) 97 18 99 %   12/18/18 1224 108/68 97.9 °F (36.6 °C) 99 18 100 %       Labs:  Recent Labs     12/19/18  0450  12/16/18  1459   WBC 13.3*   < > 14.4*   HGB 13.1*   < > 13.8      < > 266   NA  --   --  141   K  --   --  3.4*   CL  --   --  106   CO2  --   --  28   BUN  --   --  9   CREA  --   --  0.76*   GLU  --   --  116*   TBILI  --   --  0.4   SGOT  --   --  13*   ALT  --   --  27   AP  --   --  139*    < > = values in this interval not displayed. Lab Results   Component Value Date/Time    WBC 13.3 (H) 12/19/2018 04:50 AM    HGB 13.1 (L) 12/19/2018 04:50 AM    PLATELET 104 97/07/3337 04:50 AM    Sodium 141 12/16/2018 02:59 PM    Potassium 3.4 (L) 12/16/2018 02:59 PM    Chloride 106 12/16/2018 02:59 PM    CO2 28 12/16/2018 02:59 PM    BUN 9 12/16/2018 02:59 PM    Creatinine 0.76 (L) 12/16/2018 02:59 PM    Glucose 116 (H) 12/16/2018 02:59 PM    Bilirubin, total 0.4 12/16/2018 02:59 PM    AST (SGOT) 13 (L) 12/16/2018 02:59 PM    ALT (SGPT) 27 12/16/2018 02:59 PM    Alk. phosphatase 139 (H) 12/16/2018 02:59 PM       I reviewed recent labs and recent radiologic studies. I independently reviewed radiology images for studies I described above or studies I have ordered. Admission date (for inpatients): 12/16/2018   * No surgery found *  * No surgery found *    ASSESSMENT/PLAN:  Problem List  Never Reviewed          Codes Class Noted    * (Principal) Cellulitis ICD-10-CM: L03.90  ICD-9-CM: 682.9  12/16/2018        Smoking ICD-10-CM: F17.200  ICD-9-CM: 305.1  12/16/2018            Principal Problem:    Cellulitis (12/16/2018)    Active Problems:    Smoking (12/16/2018)       Plan:  Continue Abx. NPO at midnight. Do not think this is amenable to bedside I&D. Surgical timing per Dr. Gerald Nickerson.     Signed:    MONCHO Condon LVEF 25-30% (likely ischemic).    - euvolemic on exam  - F/u repeat echo 3/31/23  - c/w Hydralazine 50mg q8hrs, Isordil 20mg TID, toprol 25mg qd, Losartan 100mg QD.

## (undated) DEVICE — SUTURE VCRL SZ 2-0 L27IN ABSRB UD L26MM SH 1/2 CIR J417H

## (undated) DEVICE — AGENT HEMSTAT W2XL14IN OXIDIZED REGENERATED CELOS ABSRB FOR

## (undated) DEVICE — SOLUTION IV 1000ML 0.9% SOD CHL

## (undated) DEVICE — 2000CC GUARDIAN II: Brand: GUARDIAN

## (undated) DEVICE — CURITY IDOFORM PACKING STRIP: Brand: CURITY

## (undated) DEVICE — ABDOMINAL PAD: Brand: DERMACEA

## (undated) DEVICE — SYSTEM CULT COLL OR TRNSPRT CLR DBL SWAB W/ MOD AERB AMIES

## (undated) DEVICE — AMD ANTIMICROBIAL BANDAGE ROLL,6 PLY: Brand: KERLIX

## (undated) DEVICE — DRAPE,TOP,102X53,STERILE: Brand: MEDLINE

## (undated) DEVICE — AMD ANTIMICROBIAL GAUZE SPONGES,12 PLY USP TYPE VII, 0.2% POLYHEXAMETHYLENE BIGUANIDE HCI (PHMB): Brand: CURITY

## (undated) DEVICE — GAUZE,PACKING STRIP,IODOFORM,1/2"X5YD,ST: Brand: CURAD

## (undated) DEVICE — SUTURE ETHLN SZ 2-0 L18IN NONABSORBABLE BLK L26MM PS 3/8 585H

## (undated) DEVICE — REM POLYHESIVE ADULT PATIENT RETURN ELECTRODE: Brand: VALLEYLAB

## (undated) DEVICE — (D)PREP SKN CHLRAPRP APPL 26ML -- CONVERT TO ITEM 371833

## (undated) DEVICE — SHEET, DRAPE, SPLIT, STERILE: Brand: MEDLINE